# Patient Record
Sex: FEMALE | Race: BLACK OR AFRICAN AMERICAN | NOT HISPANIC OR LATINO | URBAN - METROPOLITAN AREA
[De-identification: names, ages, dates, MRNs, and addresses within clinical notes are randomized per-mention and may not be internally consistent; named-entity substitution may affect disease eponyms.]

---

## 2024-10-24 ENCOUNTER — INPATIENT (INPATIENT)
Facility: HOSPITAL | Age: 79
LOS: 3 days | Discharge: HOME | DRG: 312 | End: 2024-10-28
Attending: STUDENT IN AN ORGANIZED HEALTH CARE EDUCATION/TRAINING PROGRAM | Admitting: STUDENT IN AN ORGANIZED HEALTH CARE EDUCATION/TRAINING PROGRAM
Payer: MEDICARE

## 2024-10-24 VITALS
HEIGHT: 62 IN | RESPIRATION RATE: 17 BRPM | TEMPERATURE: 97 F | DIASTOLIC BLOOD PRESSURE: 108 MMHG | WEIGHT: 134.92 LBS | HEART RATE: 70 BPM | SYSTOLIC BLOOD PRESSURE: 197 MMHG | OXYGEN SATURATION: 98 %

## 2024-10-24 DIAGNOSIS — I10 ESSENTIAL (PRIMARY) HYPERTENSION: ICD-10-CM

## 2024-10-24 DIAGNOSIS — N17.9 ACUTE KIDNEY FAILURE, UNSPECIFIED: ICD-10-CM

## 2024-10-24 DIAGNOSIS — D64.9 ANEMIA, UNSPECIFIED: ICD-10-CM

## 2024-10-24 DIAGNOSIS — R55 SYNCOPE AND COLLAPSE: ICD-10-CM

## 2024-10-24 DIAGNOSIS — Z86.73 PERSONAL HISTORY OF TRANSIENT ISCHEMIC ATTACK (TIA), AND CEREBRAL INFARCTION WITHOUT RESIDUAL DEFICITS: ICD-10-CM

## 2024-10-24 DIAGNOSIS — I63.9 CEREBRAL INFARCTION, UNSPECIFIED: ICD-10-CM

## 2024-10-24 DIAGNOSIS — E78.5 HYPERLIPIDEMIA, UNSPECIFIED: ICD-10-CM

## 2024-10-24 DIAGNOSIS — E11.9 TYPE 2 DIABETES MELLITUS WITHOUT COMPLICATIONS: ICD-10-CM

## 2024-10-24 DIAGNOSIS — Z29.9 ENCOUNTER FOR PROPHYLACTIC MEASURES, UNSPECIFIED: ICD-10-CM

## 2024-10-24 DIAGNOSIS — R79.89 OTHER SPECIFIED ABNORMAL FINDINGS OF BLOOD CHEMISTRY: ICD-10-CM

## 2024-10-24 LAB
ALBUMIN SERPL ELPH-MCNC: 3.6 G/DL — SIGNIFICANT CHANGE UP (ref 3.5–5)
ALP SERPL-CCNC: 164 U/L — HIGH (ref 40–120)
ALT FLD-CCNC: 13 U/L DA — SIGNIFICANT CHANGE UP (ref 10–60)
ANION GAP SERPL CALC-SCNC: 7 MMOL/L — SIGNIFICANT CHANGE UP (ref 5–17)
AST SERPL-CCNC: 12 U/L — SIGNIFICANT CHANGE UP (ref 10–40)
BASOPHILS # BLD AUTO: 0.03 K/UL — SIGNIFICANT CHANGE UP (ref 0–0.2)
BASOPHILS NFR BLD AUTO: 0.6 % — SIGNIFICANT CHANGE UP (ref 0–2)
BILIRUB SERPL-MCNC: 0.7 MG/DL — SIGNIFICANT CHANGE UP (ref 0.2–1.2)
BUN SERPL-MCNC: 50 MG/DL — HIGH (ref 7–18)
CALCIUM SERPL-MCNC: 9.8 MG/DL — SIGNIFICANT CHANGE UP (ref 8.4–10.5)
CHLORIDE SERPL-SCNC: 106 MMOL/L — SIGNIFICANT CHANGE UP (ref 96–108)
CO2 SERPL-SCNC: 27 MMOL/L — SIGNIFICANT CHANGE UP (ref 22–31)
CREAT SERPL-MCNC: 3.79 MG/DL — HIGH (ref 0.5–1.3)
EGFR: 12 ML/MIN/1.73M2 — LOW
EOSINOPHIL # BLD AUTO: 0.06 K/UL — SIGNIFICANT CHANGE UP (ref 0–0.5)
EOSINOPHIL NFR BLD AUTO: 1.1 % — SIGNIFICANT CHANGE UP (ref 0–6)
GLUCOSE SERPL-MCNC: 131 MG/DL — HIGH (ref 70–99)
HCT VFR BLD CALC: 30.5 % — LOW (ref 34.5–45)
HGB BLD-MCNC: 9.5 G/DL — LOW (ref 11.5–15.5)
IMM GRANULOCYTES NFR BLD AUTO: 0.4 % — SIGNIFICANT CHANGE UP (ref 0–0.9)
LYMPHOCYTES # BLD AUTO: 1.24 K/UL — SIGNIFICANT CHANGE UP (ref 1–3.3)
LYMPHOCYTES # BLD AUTO: 23.5 % — SIGNIFICANT CHANGE UP (ref 13–44)
MCHC RBC-ENTMCNC: 25.7 PG — LOW (ref 27–34)
MCHC RBC-ENTMCNC: 31.1 GM/DL — LOW (ref 32–36)
MCV RBC AUTO: 82.7 FL — SIGNIFICANT CHANGE UP (ref 80–100)
MONOCYTES # BLD AUTO: 0.26 K/UL — SIGNIFICANT CHANGE UP (ref 0–0.9)
MONOCYTES NFR BLD AUTO: 4.9 % — SIGNIFICANT CHANGE UP (ref 2–14)
NEUTROPHILS # BLD AUTO: 3.66 K/UL — SIGNIFICANT CHANGE UP (ref 1.8–7.4)
NEUTROPHILS NFR BLD AUTO: 69.5 % — SIGNIFICANT CHANGE UP (ref 43–77)
NRBC # BLD: 0 /100 WBCS — SIGNIFICANT CHANGE UP (ref 0–0)
PLATELET # BLD AUTO: 326 K/UL — SIGNIFICANT CHANGE UP (ref 150–400)
POTASSIUM SERPL-MCNC: 3 MMOL/L — LOW (ref 3.5–5.3)
POTASSIUM SERPL-SCNC: 3 MMOL/L — LOW (ref 3.5–5.3)
PROT SERPL-MCNC: 7.9 G/DL — SIGNIFICANT CHANGE UP (ref 6–8.3)
RBC # BLD: 3.69 M/UL — LOW (ref 3.8–5.2)
RBC # FLD: 14.1 % — SIGNIFICANT CHANGE UP (ref 10.3–14.5)
SODIUM SERPL-SCNC: 140 MMOL/L — SIGNIFICANT CHANGE UP (ref 135–145)
TROPONIN I, HIGH SENSITIVITY RESULT: 123.5 NG/L — HIGH
WBC # BLD: 5.27 K/UL — SIGNIFICANT CHANGE UP (ref 3.8–10.5)
WBC # FLD AUTO: 5.27 K/UL — SIGNIFICANT CHANGE UP (ref 3.8–10.5)

## 2024-10-24 PROCEDURE — 70450 CT HEAD/BRAIN W/O DYE: CPT | Mod: 26

## 2024-10-24 PROCEDURE — 99223 1ST HOSP IP/OBS HIGH 75: CPT | Mod: GC

## 2024-10-24 PROCEDURE — 93010 ELECTROCARDIOGRAM REPORT: CPT

## 2024-10-24 PROCEDURE — 99285 EMERGENCY DEPT VISIT HI MDM: CPT

## 2024-10-24 RX ORDER — INSULIN LISPRO 100/ML
VIAL (ML) SUBCUTANEOUS
Refills: 0 | Status: DISCONTINUED | OUTPATIENT
Start: 2024-10-24 | End: 2024-10-28

## 2024-10-24 RX ORDER — INFLUENZ VIR VAC TV P-SURF2003 15MCG/.5ML
0.5 SYRINGE (ML) INTRAMUSCULAR ONCE
Refills: 0 | Status: DISCONTINUED | OUTPATIENT
Start: 2024-10-24 | End: 2024-10-28

## 2024-10-24 RX ORDER — AMLODIPINE BESYLATE 10 MG
5 TABLET ORAL ONCE
Refills: 0 | Status: COMPLETED | OUTPATIENT
Start: 2024-10-24 | End: 2024-10-24

## 2024-10-24 RX ORDER — ONDANSETRON HYDROCHLORIDE 2 MG/ML
4 INJECTION, SOLUTION INTRAMUSCULAR; INTRAVENOUS EVERY 8 HOURS
Refills: 0 | Status: DISCONTINUED | OUTPATIENT
Start: 2024-10-24 | End: 2024-10-28

## 2024-10-24 RX ORDER — POTASSIUM CHLORIDE 10 MEQ
40 TABLET, EXTENDED RELEASE ORAL ONCE
Refills: 0 | Status: COMPLETED | OUTPATIENT
Start: 2024-10-24 | End: 2024-10-25

## 2024-10-24 RX ORDER — ASPIRIN/MAG CARB/ALUMINUM AMIN 325 MG
81 TABLET ORAL DAILY
Refills: 0 | Status: DISCONTINUED | OUTPATIENT
Start: 2024-10-25 | End: 2024-10-28

## 2024-10-24 RX ORDER — ASPIRIN/MAG CARB/ALUMINUM AMIN 325 MG
325 TABLET ORAL ONCE
Refills: 0 | Status: COMPLETED | OUTPATIENT
Start: 2024-10-24 | End: 2024-10-25

## 2024-10-24 RX ORDER — ACETAMINOPHEN 500 MG
650 TABLET ORAL EVERY 6 HOURS
Refills: 0 | Status: DISCONTINUED | OUTPATIENT
Start: 2024-10-24 | End: 2024-10-28

## 2024-10-24 RX ORDER — GLUCAGON INJECTION, SOLUTION 1 MG/.2ML
1 INJECTION, SOLUTION SUBCUTANEOUS ONCE
Refills: 0 | Status: DISCONTINUED | OUTPATIENT
Start: 2024-10-24 | End: 2024-10-28

## 2024-10-24 RX ORDER — MELATONIN 5 MG
3 TABLET ORAL AT BEDTIME
Refills: 0 | Status: DISCONTINUED | OUTPATIENT
Start: 2024-10-24 | End: 2024-10-28

## 2024-10-24 RX ORDER — HEPARIN SODIUM 10000 [USP'U]/ML
5000 INJECTION INTRAVENOUS; SUBCUTANEOUS EVERY 8 HOURS
Refills: 0 | Status: DISCONTINUED | OUTPATIENT
Start: 2024-10-24 | End: 2024-10-28

## 2024-10-24 RX ORDER — ASPIRIN/MAG CARB/ALUMINUM AMIN 325 MG
324 TABLET ORAL ONCE
Refills: 0 | Status: COMPLETED | OUTPATIENT
Start: 2024-10-24 | End: 2024-10-24

## 2024-10-24 RX ORDER — MAGNESIUM, ALUMINUM HYDROXIDE 200-200 MG
30 TABLET,CHEWABLE ORAL EVERY 4 HOURS
Refills: 0 | Status: DISCONTINUED | OUTPATIENT
Start: 2024-10-24 | End: 2024-10-28

## 2024-10-24 RX ADMIN — Medication 5 MILLIGRAM(S): at 15:01

## 2024-10-24 RX ADMIN — Medication 324 MILLIGRAM(S): at 15:52

## 2024-10-24 RX ADMIN — HEPARIN SODIUM 5000 UNIT(S): 10000 INJECTION INTRAVENOUS; SUBCUTANEOUS at 22:50

## 2024-10-24 RX ADMIN — Medication 80 MILLIGRAM(S): at 23:31

## 2024-10-24 NOTE — H&P ADULT - CONVERSATION DETAILS
Ms. Garcia was admitted to the  medicine floors .   I spoke with the patient who  and we had an extensive conversation about her  care and current condition.     Discussed overall goals of care with the patient.  During this discussion we reviewed the current diagnosis, treatment plan, and likely prognosis. An explantation of advanced directives and MOLST was provided. The patient identifies quality of  life as one of the most important goals to the patient.  After this conversation decision was made by the patient to change status to DNR/DNI/ NO NIV Allow Natural Death. MOLST form completed, signed, and placed in chart.     Above was reviewed with medicine  attending physician  .     JUANITO Cary

## 2024-10-24 NOTE — ED ADULT TRIAGE NOTE - CHIEF COMPLAINT QUOTE
syncopal episode while waiting for food in a restaurant , did not fall  Stated fell dizzy , sat on a chair , then had the episode  As per EMS, ambulatory on scene  non compliant with meds x 1 year

## 2024-10-24 NOTE — H&P ADULT - HISTORY OF PRESENT ILLNESS
79Y/F from home ( alone, NO HHA ) with a PMHx of HTN, DM, HLD, not currently on medications x  past 1 year as her doctor retired and she does not have a PCP since that point BIBEMS  after syncopal episode earlier this afternoon.  Reporting that she was at a takeout waiting to get her food when she suddenly felt lightheaded and fell like she was going to faint.  She was noticed to be unwell by a bystander who called her and sat her down so she did not sustain any injuries.  She syncopized briefly before returning to normal.  She denies any preceding chest pains or shortness of breath.  She denies any chest pains or shortness of breath after the episode.  She does report that she has syncopized in the past (last 1 year ago) but has not had any kind of extended of cardiac monitor or workup.  Patient also states she had a Hx of ? kidney infection or kidney condition which she was supposed to follow up with Nephrologist OP.   Patient states she had a TIA/ mini stroke  2 years ago with no residual deficits for which she was treated at [facility] in [place].     In the ER,   VS /100, RR 18, HR 63, afebrile, sats 100% RA   Labs s/o Hb 9.5, K 3, BUN / Cr 50/ 3.79, , trop 123   EKG - NSR     CTH   Advanced periventricular deep and subcortical white matter   ischemia. 1.1 cm RIGHT anterior frontal parafalcine calcified angioma is   noted.    Marked central atrophy and moderate temporal lobe atrophy.

## 2024-10-24 NOTE — ED PROVIDER NOTE - CLINICAL SUMMARY MEDICAL DECISION MAKING FREE TEXT BOX
Patient presenting with syncopal episode with aura without chest pain.  Is significantly hypertensive on arrival likely chronic as she reports that she has been out of medications for some time.  No focal findings on exam aside from elevated blood pressure.  EKG without diagnostic findings.  Plan for screening labs including troponin given patient's age.  May require admission for further treatment given lack of outpatient follow-up.

## 2024-10-24 NOTE — H&P ADULT - PROBLEM SELECTOR PLAN 3
- Maintain active T&S, 2 large bore peripheral IVs, transfuse for goal hgb >7 or if symptomatic Hb 9.5    - Maintain active T&S, 2 large bore peripheral IVs, transfuse for goal hgb >7 or if symptomatic  - trend CBC  - f/u anemia labs in AM Trop elevated x 1   EKG NSR   patient denies chest pain     - f/u rep trop 10 PM

## 2024-10-24 NOTE — H&P ADULT - PROBLEM SELECTOR PLAN 5
- pt takes rosuvastatin 20 mg at home  - c/w atorvastatin 40 mg   - f/u lipid profile  - Dash Diet - c/w atorvastatin 80 mg   - f/u lipid profile  - Dash Diet h/o DM not on any home meds ( not seen PCP)   will  hold oral dm meds while inpatient   f/u A1c  c/w  sliding scale  Adjust insulin as indicated  FS ACHS q6 while NPO

## 2024-10-24 NOTE — ED PROVIDER NOTE - OBJECTIVE STATEMENT
79-year-old woman with a past medical history of hypertension and diabetes not currently on medications as her doctor retired and she does not have a PCP since that point presenting after syncopal episode earlier this afternoon.  Reporting that she was at a takeout waiting to get her food when she suddenly felt lightheaded and fell like she was going to faint.  She was noticed to be unwell by a bystander who called her and sat her down so she did not sustain any injuries.  She syncopized briefly before returning to normal.  She denies any preceding chest pains or shortness of breath.  She denies any chest pains or shortness of breath after the episode.  She does report that she has syncopized in the past but has not had any kind of extended of cardiac monitor or workup.

## 2024-10-24 NOTE — H&P ADULT - PROBLEM/PLAN-9
Anesthetic History   No history of anesthetic complications            Review of Systems / Medical History  Patient summary reviewed, nursing notes reviewed and pertinent labs reviewed    Pulmonary  Within defined limits                 Neuro/Psych   Within defined limits           Cardiovascular  Within defined limits                     GI/Hepatic/Renal  Within defined limits              Endo/Other  Within defined limits           Other Findings              Physical Exam    Airway             Cardiovascular  Regular rate and rhythm,  S1 and S2 normal,  no murmur, click, rub, or gallop             Dental         Pulmonary  Breath sounds clear to auscultation               Abdominal         Other Findings            Anesthetic Plan    ASA: 1  Anesthesia type: general          Induction: Inhalational  Anesthetic plan and risks discussed with: Patient and Parent / Carmelo Marti
DISPLAY PLAN FREE TEXT

## 2024-10-24 NOTE — PATIENT PROFILE ADULT - FALL HARM RISK - HARM RISK INTERVENTIONS
Assistance with ambulation/Assistance OOB with selected safe patient handling equipment/Communicate Risk of Fall with Harm to all staff/Discuss with provider need for PT consult/Monitor gait and stability/Orthostatic vital signs/Provide patient with walking aids - walker, cane, crutches/Reinforce activity limits and safety measures with patient and family/Sit up slowly, dangle for a short time, stand at bedside before walking/Tailored Fall Risk Interventions/Visual Cue: Yellow wristband and red socks/Bed in lowest position, wheels locked, appropriate side rails in place/Call bell, personal items and telephone in reach/Instruct patient to call for assistance before getting out of bed or chair/Non-slip footwear when patient is out of bed/[place] to call system/Physically safe environment - no spills, clutter or unnecessary equipment/Purposeful Proactive Rounding/Room/bathroom lighting operational, light cord in reach

## 2024-10-24 NOTE — H&P ADULT - PROBLEM SELECTOR PLAN 8
Hx of TIA - no residual deficits ( 2 y ago) Pt w/  BUN / Cr 50/ 3.79  Baseline SCr -unknown   Patient endorses Hx of kidney problem , did not follow with nephro as OP   For now, IVF   Follow BMP daily

## 2024-10-24 NOTE — H&P ADULT - PROBLEM SELECTOR PLAN 4
h/o DM on ____  will  hold oral dm meds while inpatient   f/u A1c  c/w lantus + lispro + sliding scale  Adjust insulin as indicated  FS ACHS q6 while NPO h/o DM not on any home meds ( not seen PCP)   will  hold oral dm meds while inpatient   f/u A1c  c/w  sliding scale  Adjust insulin as indicated  FS ACHS q6 while NPO Hb 9.5    - Maintain active T&S, 2 large bore peripheral IVs, transfuse for goal hgb >7 or if symptomatic  - trend CBC  - f/u anemia labs in AM

## 2024-10-24 NOTE — ED ADULT NURSE NOTE - OBJECTIVE STATEMENT
Reports almost passed out while waiting for food at the restaurant ,was able to sit on a chair ,denied losing consciousness ,falling

## 2024-10-24 NOTE — H&P ADULT - PROBLEM SELECTOR PLAN 2
EKG showing    -orthostatic vitals  -telemetry  -TTE  -cardio  EKG showing NSR   likely 2/2 orthostsis ( patient felt dizzy from laying down to sitting  position )   -orthostatic vitals  -telemetry  -TTE      consider cardiology consult

## 2024-10-24 NOTE — H&P ADULT - ASSESSMENT
79Y/F from home ( alone, NO HHA ) with a PMHx of HTN, DM, HLD, not currently on medications x  past 1 year as her doctor retired and she does not have a PCP since that point BIBEMS  after syncopal episode earlier this afternoon.  Reporting that she was at a takeout waiting to get her food when she suddenly felt lightheaded and fell like she was going to faint.  She was noticed to be unwell by a bystander who called her and sat her down so she did not sustain any injuries.  She syncopized briefly before returning to normal.  She denies any preceding chest pains or shortness of breath.  She denies any chest pains or shortness of breath after the episode.  She does report that she has syncopized in the past (last 1 year ago) but has not had any kind of extended of cardiac monitor or workup.  Patient also states she had a Hx of ? kidney infection or kidney condition which she was supposed to follow up with Nephrologist OP.   Patient states she had a TIA/ mini stroke  2 years ago with no residual deficits for which she was treated at [facility] in [place].     In the ER,   VS /100, RR 18, HR 63, afebrile, sats 100% RA   Labs s/o Hb 9.5, K 3, BUN / Cr 50/ 3.79, , trop 123   EKG - NSR     CTH   Advanced periventricular deep and subcortical white matter   ischemia. 1.1 cm RIGHT anterior frontal parafalcine calcified angioma is   noted.    Marked central atrophy and moderate temporal lobe atrophy.     Admitted to tele for syncope vs CVA r/o.     79Y/F from home ( alone, NO HHA ) with a PMHx of HTN, DM, HLD, not currently on medications x  past 1 year as her doctor retired and she does not have a PCP since that point BIBEMS  after syncopal episode earlier this afternoon.   In the ER, VS /100, RR 18, HR 63, afebrile, sats 100% RA, Labs s/o Hb 9.5, K 3, BUN / Cr 50/ 3.79, , trop 123 , EKG - NSR , CTH -Advanced periventricular deep and subcortical white matter ischemia.     Admitted to tele for syncope vs CVA r/o.

## 2024-10-24 NOTE — H&P ADULT - NSICDXPASTMEDICALHX_GEN_ALL_CORE_FT
PAST MEDICAL HISTORY:  HLD (hyperlipidemia)     HTN (hypertension)     Transient ischemic attack

## 2024-10-24 NOTE — H&P ADULT - PROBLEM SELECTOR PLAN 6
h/o HTN on  Monitor BP  c/w home meds -  Lower MAP 20-25% in next 2-4 hrs h/o HTN on ? labetalol   Monitor BP  hold  home meds for permissive HTN - c/w atorvastatin 80 mg   - f/u lipid profile  - Dash Diet

## 2024-10-24 NOTE — H&P ADULT - PROBLEM SELECTOR PLAN 1
LKW  CT showing   Complaining of  Code stroke NIHSS     called in ED  s/p  (TNK)  EKG showing NSR   dysphagia screen -     -ASA  -plavix 75 mg for 3 weeks  -atorvastatin 80 mg   -telemetry  -cardio  -neuro  -f/u a1c  -f/u lipids  -f/u TTE  -PT consult [facility] this morning - 1 episode of syncope   CT showing chronic changes ( confirmed with radiologist)   EKG showing NSR   dysphagia screen   -ASA  -atorvastatin 80 mg   -telemetry  -neuro- Dr. Briggs consulted   -f/u a1c  -f/u lipids  -f/u TTE  -PT consult

## 2024-10-24 NOTE — ED PROVIDER NOTE - CARE PLAN
1 Principal Discharge DX:	Syncope  Secondary Diagnosis:	Uncontrolled hypertension  Secondary Diagnosis:	Chronic kidney disease (CKD)

## 2024-10-24 NOTE — ED PROVIDER NOTE - PHYSICAL EXAMINATION
Exam:  General: Patient well appearing, significantly hypertensive otherwise vital signs within normal limits  HEENT: airway patent with moist mucous membranes  Cardiac: RRR S1/S2 with strong peripheral pulses  Respiratory: lungs clear without respiratory distress  GI: abdomen soft, non tender, non distended  Neuro: no gross neurologic deficits  Skin: warm, well perfused  Psych: normal mood and affect

## 2024-10-24 NOTE — ED PROVIDER NOTE - PROGRESS NOTE DETAILS
Patient's labs notable for likely chronic kidney disease–no prior records available however given her BUN to creatinine ratio I suspect this is more chronic in nature and likely due to her chronically uncontrolled hypertension rather than acute kidney injury as she does not clinically appear dehydrated.  Does have a mildly elevated troponin which could be a marker of cardiac ischemia in the setting of an acute syncopal episode or just could be nonfiltration from the kidney dysfunction potassium also mildly low.  Will add aspirin given the troponin until ACS ruled out.  Will require admission for telemetry monitoring and further treatment of the multiple issues at this time

## 2024-10-24 NOTE — H&P ADULT - ATTENDING COMMENTS
I have seen and evaluated the patient in the emergency department.     She says that she was at a take out place earlier in the day when she felt dizzy and "don't know if I was out of it or not." She says another patron was worried she may have fainted for a few seconds and insisted an ambulance be called and have her brought to the hospital. She was feeling well in her usual state of health before this episode with no other symptoms to report before and no symptoms at present during this interview. She has no fever, chills, HA, CP, dyspnea, numbness or weakness or slurred speech.     On exam, she is a thin woman sitting upright at 45 degrees in ED stretcher in no acute distress. She is afebrile, HR 60-70s, severe hypertension with systolic BP in 190s-200s. Saturating 100% on room air and breathing comfortably on room air. Cardiac exam with normal rate, regular rhythm, no murmurs appreciated. No carotid bruits. Abdomen soft and nontender. No pitting edema.     Neuro exam: Mental status normal. CN II-XII intact without deficits. 5/5 Motor function bilaterally in UE and LE. No dysmetria. Sensation intact to light touch,     I have reviewed her CBC, BMP, cardiac enzymes, glucose. Hgb is 9.5 g/dL. BUN is 50 and Cr is 3.7, unknown baseline. Na 140, K 3. Bicarb 27.     I have personally reviewed her CT Head, do not see hemorrhage or large territory infarct. There is radiologic report of advanced periventricular deep and subcortical white matter ischemia (tried to call radiology for further clarification, no answer).     Assessment and Plan:    79 year old female from home ( alone, NO HHA ) with a PMHx of HTN, DM, HLD, not currently on medications x  past 1 year as her doctor retired and she does not have a PCP since that point BIBEMS  after syncopal episode earlier this afternoon.  Patient was standing and waiting on food at time of event, concerned for neurocardiogenic syncope/vasovagal event. Will admit to tele and obtain echocardiogram for structural heart disease, especially given her age and extreme hypertension.    Regarding her head CT findings, unclear if these are chronic white matter ischemic changes versus acute ischemia, but NIHSS is 0, no focal deficit. Given her severe CKD vs LASHAWN on CKD, will forego CTA Head and Neck as patient is outside window for TPA and even if large vessel occlusion is found, do not feel there would be indication for urgent intervention given her lack of symptoms and normal neurologic exam.     #Sycnope  #Rule Out CVA  #Hypertensive Urgency  #LASHAWN vs LASHAWN on CKD  #DM  #HLD    -admit to tele  -Neuro Consult  -aspirin  -high intensity statin  -TTE with bubble  -permissive hypertension; will given IV labetalol if SBP > 220 mm Hg   -consider cardiology consult in AM  -CT Head reviewed, as above will hold on CTA Head and Neck; if Neurology feels vascular imaging needed, can consider Carotid Dopplers  -trend renal function, but given age, risk factors, and no control on BP, suspect this Cr represents CKD 4    -rest of care per resident note I have seen and evaluated the patient in the emergency department.     She says that she was at a take out place earlier in the day when she felt dizzy and "don't know if I was out of it or not." She says another patron was worried she may have fainted for a few seconds and insisted an ambulance be called and have her brought to the hospital. She was feeling well in her usual state of health before this episode with no other symptoms to report before and no symptoms at present during this interview. She has no fever, chills, HA, CP, dyspnea, numbness or weakness or slurred speech.     On exam, she is a thin woman sitting upright at 45 degrees in ED stretcher in no acute distress. She is afebrile, HR 60-70s, severe hypertension with systolic BP in 190s-200s. Saturating 100% on room air and breathing comfortably on room air. Cardiac exam with normal rate, regular rhythm, no murmurs appreciated. No carotid bruits. Abdomen soft and nontender. No pitting edema.     Neuro exam: Mental status normal. CN II-XII intact without deficits. 5/5 Motor function bilaterally in UE and LE. No dysmetria. Sensation intact to light touch,     I have reviewed her CBC, BMP, cardiac enzymes, glucose. Hgb is 9.5 g/dL. BUN is 50 and Cr is 3.7, unknown baseline. Na 140, K 3. Bicarb 27.     I have personally reviewed her CT Head, do not see hemorrhage or large territory infarct. There is radiologic report of advanced periventricular deep and subcortical white matter ischemia (tried to call radiology for further clarification, no answer).     Assessment and Plan:    79 year old female from home ( alone, NO HHA ) with a PMHx of HTN, DM, HLD, not currently on medications x  past 1 year as her doctor retired and she does not have a PCP since that point BIBEMS  after syncopal episode earlier this afternoon.  Patient was standing and waiting on food at time of event, concerned for neurocardiogenic syncope/vasovagal event. Will admit to tele and obtain echocardiogram for structural heart disease, especially given her age and extreme hypertension.    Regarding her head CT findings, unclear if these are chronic white matter ischemic changes versus acute ischemia, but NIHSS is 0, no focal deficit. Given her severe CKD vs LASHAWN on CKD, will forego CTA Head and Neck as patient is outside window for TPA and even if large vessel occlusion is found, do not feel there would be indication for urgent intervention given her lack of symptoms and normal neurologic exam.     #Sycnope  #Rule Out CVA  #Hypertensive Urgency  #LASHAWN vs LASHAWN on CKD  #DM  #HLD    -admit to tele  -Neuro Consult  -aspirin  -high intensity statin  -TTE with bubble  -permissive hypertension; will given IV labetalol if SBP > 220 mm Hg   -consider cardiology consult in AM  -CT Head reviewed, as above will hold on CTA Head and Neck; if Neurology feels vascular imaging needed, can consider Carotid Dopplers (but suspect that this finding is chronic in nature, and no additional head/neck imaging will be required this admission)  -trend renal function, but given age, risk factors, and no control on BP, suspect this Cr represents CKD 4    -rest of care per resident note

## 2024-10-25 DIAGNOSIS — N18.9 CHRONIC KIDNEY DISEASE, UNSPECIFIED: ICD-10-CM

## 2024-10-25 LAB
A1C WITH ESTIMATED AVERAGE GLUCOSE RESULT: 6 % — HIGH (ref 4–5.6)
ALBUMIN SERPL ELPH-MCNC: 3.8 G/DL — SIGNIFICANT CHANGE UP (ref 3.5–5)
ALP SERPL-CCNC: 162 U/L — HIGH (ref 40–120)
ALT FLD-CCNC: 15 U/L DA — SIGNIFICANT CHANGE UP (ref 10–60)
ANION GAP SERPL CALC-SCNC: 7 MMOL/L — SIGNIFICANT CHANGE UP (ref 5–17)
AST SERPL-CCNC: 11 U/L — SIGNIFICANT CHANGE UP (ref 10–40)
BASOPHILS # BLD AUTO: 0.05 K/UL — SIGNIFICANT CHANGE UP (ref 0–0.2)
BASOPHILS NFR BLD AUTO: 0.9 % — SIGNIFICANT CHANGE UP (ref 0–2)
BILIRUB SERPL-MCNC: 0.6 MG/DL — SIGNIFICANT CHANGE UP (ref 0.2–1.2)
BUN SERPL-MCNC: 49 MG/DL — HIGH (ref 7–18)
CALCIUM SERPL-MCNC: 9.3 MG/DL — SIGNIFICANT CHANGE UP (ref 8.4–10.5)
CHLORIDE SERPL-SCNC: 107 MMOL/L — SIGNIFICANT CHANGE UP (ref 96–108)
CHOLEST SERPL-MCNC: 232 MG/DL — HIGH
CO2 SERPL-SCNC: 27 MMOL/L — SIGNIFICANT CHANGE UP (ref 22–31)
CREAT SERPL-MCNC: 3.55 MG/DL — HIGH (ref 0.5–1.3)
EGFR: 13 ML/MIN/1.73M2 — LOW
EOSINOPHIL # BLD AUTO: 0.15 K/UL — SIGNIFICANT CHANGE UP (ref 0–0.5)
EOSINOPHIL NFR BLD AUTO: 2.7 % — SIGNIFICANT CHANGE UP (ref 0–6)
ESTIMATED AVERAGE GLUCOSE: 126 MG/DL — HIGH (ref 68–114)
GLUCOSE BLDC GLUCOMTR-MCNC: 121 MG/DL — HIGH (ref 70–99)
GLUCOSE BLDC GLUCOMTR-MCNC: 124 MG/DL — HIGH (ref 70–99)
GLUCOSE BLDC GLUCOMTR-MCNC: 135 MG/DL — HIGH (ref 70–99)
GLUCOSE BLDC GLUCOMTR-MCNC: 147 MG/DL — HIGH (ref 70–99)
GLUCOSE BLDC GLUCOMTR-MCNC: 215 MG/DL — HIGH (ref 70–99)
GLUCOSE SERPL-MCNC: 131 MG/DL — HIGH (ref 70–99)
HCT VFR BLD CALC: 31 % — LOW (ref 34.5–45)
HDLC SERPL-MCNC: 46 MG/DL — LOW
HGB BLD-MCNC: 9.9 G/DL — LOW (ref 11.5–15.5)
IMM GRANULOCYTES NFR BLD AUTO: 0.2 % — SIGNIFICANT CHANGE UP (ref 0–0.9)
LIPID PNL WITH DIRECT LDL SERPL: 120 MG/DL — HIGH
LYMPHOCYTES # BLD AUTO: 1.76 K/UL — SIGNIFICANT CHANGE UP (ref 1–3.3)
LYMPHOCYTES # BLD AUTO: 32 % — SIGNIFICANT CHANGE UP (ref 13–44)
MAGNESIUM SERPL-MCNC: 2.5 MG/DL — SIGNIFICANT CHANGE UP (ref 1.6–2.6)
MCHC RBC-ENTMCNC: 26.1 PG — LOW (ref 27–34)
MCHC RBC-ENTMCNC: 31.9 GM/DL — LOW (ref 32–36)
MCV RBC AUTO: 81.6 FL — SIGNIFICANT CHANGE UP (ref 80–100)
MONOCYTES # BLD AUTO: 0.41 K/UL — SIGNIFICANT CHANGE UP (ref 0–0.9)
MONOCYTES NFR BLD AUTO: 7.5 % — SIGNIFICANT CHANGE UP (ref 2–14)
NEUTROPHILS # BLD AUTO: 3.12 K/UL — SIGNIFICANT CHANGE UP (ref 1.8–7.4)
NEUTROPHILS NFR BLD AUTO: 56.7 % — SIGNIFICANT CHANGE UP (ref 43–77)
NON HDL CHOLESTEROL: 186 MG/DL — HIGH
NRBC # BLD: 0 /100 WBCS — SIGNIFICANT CHANGE UP (ref 0–0)
PHOSPHATE SERPL-MCNC: 3.3 MG/DL — SIGNIFICANT CHANGE UP (ref 2.5–4.5)
PLATELET # BLD AUTO: 337 K/UL — SIGNIFICANT CHANGE UP (ref 150–400)
POTASSIUM SERPL-MCNC: 3.1 MMOL/L — LOW (ref 3.5–5.3)
POTASSIUM SERPL-SCNC: 3.1 MMOL/L — LOW (ref 3.5–5.3)
PROT SERPL-MCNC: 7.7 G/DL — SIGNIFICANT CHANGE UP (ref 6–8.3)
RBC # BLD: 3.8 M/UL — SIGNIFICANT CHANGE UP (ref 3.8–5.2)
RBC # FLD: 14.2 % — SIGNIFICANT CHANGE UP (ref 10.3–14.5)
SODIUM SERPL-SCNC: 141 MMOL/L — SIGNIFICANT CHANGE UP (ref 135–145)
TRIGL SERPL-MCNC: 331 MG/DL — HIGH
TROPONIN I, HIGH SENSITIVITY RESULT: 113.2 NG/L — HIGH
TROPONIN I, HIGH SENSITIVITY RESULT: 122.9 NG/L — HIGH
TROPONIN I, HIGH SENSITIVITY RESULT: 123.7 NG/L — HIGH
TSH SERPL-MCNC: 1.45 UU/ML — SIGNIFICANT CHANGE UP (ref 0.34–4.82)
WBC # BLD: 5.5 K/UL — SIGNIFICANT CHANGE UP (ref 3.8–10.5)
WBC # FLD AUTO: 5.5 K/UL — SIGNIFICANT CHANGE UP (ref 3.8–10.5)

## 2024-10-25 PROCEDURE — 99223 1ST HOSP IP/OBS HIGH 75: CPT

## 2024-10-25 PROCEDURE — 99233 SBSQ HOSP IP/OBS HIGH 50: CPT | Mod: GC

## 2024-10-25 RX ORDER — HYDRALAZINE HYDROCHLORIDE 50 MG/1
25 TABLET, FILM COATED ORAL EVERY 8 HOURS
Refills: 0 | Status: DISCONTINUED | OUTPATIENT
Start: 2024-10-25 | End: 2024-10-25

## 2024-10-25 RX ORDER — LABETALOL HCL 200 MG
100 TABLET ORAL
Refills: 0 | Status: DISCONTINUED | OUTPATIENT
Start: 2024-10-25 | End: 2024-10-26

## 2024-10-25 RX ORDER — POTASSIUM CHLORIDE 10 MEQ
40 TABLET, EXTENDED RELEASE ORAL ONCE
Refills: 0 | Status: COMPLETED | OUTPATIENT
Start: 2024-10-25 | End: 2024-10-25

## 2024-10-25 RX ADMIN — Medication 40 MILLIEQUIVALENT(S): at 18:33

## 2024-10-25 RX ADMIN — HYDRALAZINE HYDROCHLORIDE 25 MILLIGRAM(S): 50 TABLET, FILM COATED ORAL at 09:37

## 2024-10-25 RX ADMIN — HEPARIN SODIUM 5000 UNIT(S): 10000 INJECTION INTRAVENOUS; SUBCUTANEOUS at 05:54

## 2024-10-25 RX ADMIN — Medication 2: at 12:15

## 2024-10-25 RX ADMIN — Medication 100 MILLIGRAM(S): at 18:33

## 2024-10-25 RX ADMIN — Medication 40 MILLIEQUIVALENT(S): at 03:23

## 2024-10-25 RX ADMIN — Medication 325 MILLIGRAM(S): at 05:54

## 2024-10-25 RX ADMIN — Medication 81 MILLIGRAM(S): at 12:03

## 2024-10-25 RX ADMIN — HEPARIN SODIUM 5000 UNIT(S): 10000 INJECTION INTRAVENOUS; SUBCUTANEOUS at 21:46

## 2024-10-25 RX ADMIN — Medication 80 MILLIGRAM(S): at 21:46

## 2024-10-25 RX ADMIN — HEPARIN SODIUM 5000 UNIT(S): 10000 INJECTION INTRAVENOUS; SUBCUTANEOUS at 13:08

## 2024-10-25 RX ADMIN — Medication 40 MILLIEQUIVALENT(S): at 12:05

## 2024-10-25 NOTE — PROGRESS NOTE ADULT - PROBLEM SELECTOR PLAN 5
- h/o DM, but not on any home meds and not seen PCP since Jan/2023  - A1c: 6.0  - c/w  sliding scale  - Adjust insulin as indicated  - FS ACHS q6 while NPO

## 2024-10-25 NOTE — PROGRESS NOTE ADULT - PROBLEM SELECTOR PLAN 6
- c/w atorvastatin 80 mg PO daily  - Lipid profile: , HDL 46, Cholesterol 232, Triglycerides 331   - Dash Diet - h/o DM, but not on any home meds and not seen PCP since Jan/2023  - A1c: 6.0  - c/w  sliding scale  - Adjust insulin as indicated  - FS ACHS q6 while NPO

## 2024-10-25 NOTE — PROGRESS NOTE ADULT - PROBLEM SELECTOR PLAN 7
- h/o HTN on ? labetalol   - Monitor BP: 216/88 (10/24) > 158/98 (10/25)  - hold home meds for permissive HTN

## 2024-10-25 NOTE — PROGRESS NOTE ADULT - PROBLEM SELECTOR PLAN 6
- c/w atorvastatin 80 mg PO daily  - Lipid profile: , HDL 46, Cholesterol 232, Triglycerides 331   - Dash Diet

## 2024-10-25 NOTE — PHYSICAL THERAPY INITIAL EVALUATION ADULT - DIAGNOSIS, PT EVAL
(ICF Model) Pt. present w/deficits in Body Structures/Function (Impairments), incl: Strength, Balance, gait, leading to deficits  of ambulating safely and Unable to safely assess patient on stairs at this time due to elevated BP.

## 2024-10-25 NOTE — PROGRESS NOTE ADULT - SUBJECTIVE AND OBJECTIVE BOX
PGY-1 Progress Note discussed with attending      PLEASE CONTACT ON CALL TEAM:  - On Call Team (Please refer to Callie) FROM 5:00 PM - 8:30PM  - Nightfloat Team FROM 8:30 -7:30 AM    INTERVAL HPI/OVERNIGHT EVENTS:     No acute overnight events. Pt evaluated at bedside. Pt has no new complaints. No complaints of weakness, loss of sensation.      REVIEW OF SYSTEMS:  CONSTITUTIONAL: No acute distress  RESPIRATORY: No shortness of breath, wheezing, or cough  CARDIOVASCULAR: No chest pain or palpitations  GASTROINTESTINAL: No abdominal pain, nausea, vomiting, diarrhea, or constipation  GENITOURINARY: No dysuria or hematuria  NEUROLOGICAL: No numbness, tremors, or loss of strength. Syncope  SKIN: No new lesions    MEDICATIONS  (STANDING):  aspirin enteric coated 81 milliGRAM(s) Oral daily  atorvastatin 80 milliGRAM(s) Oral at bedtime  dextrose 5%. 1000 milliLiter(s) (50 mL/Hr) IV Continuous <Continuous>  dextrose 50% Injectable 25 Gram(s) IV Push once  glucagon  Injectable 1 milliGRAM(s) IntraMuscular once  heparin   Injectable 5000 Unit(s) SubCutaneous every 8 hours  hydrALAZINE 25 milliGRAM(s) Oral every 8 hours  influenza  Vaccine (HIGH DOSE) 0.5 milliLiter(s) IntraMuscular once  insulin lispro (ADMELOG) corrective regimen sliding scale   SubCutaneous three times a day before meals  potassium chloride    Tablet ER 40 milliEquivalent(s) Oral once  potassium chloride    Tablet ER 40 milliEquivalent(s) Oral once    MEDICATIONS  (PRN):  acetaminophen     Tablet .. 650 milliGRAM(s) Oral every 6 hours PRN Temp greater or equal to 38C (100.4F), Mild Pain (1 - 3)  aluminum hydroxide/magnesium hydroxide/simethicone Suspension 30 milliLiter(s) Oral every 4 hours PRN Dyspepsia  dextrose Oral Gel 15 Gram(s) Oral once PRN Blood Glucose LESS THAN 70 milliGRAM(s)/deciliter  melatonin 3 milliGRAM(s) Oral at bedtime PRN Insomnia  ondansetron Injectable 4 milliGRAM(s) IV Push every 8 hours PRN Nausea and/or Vomiting      Vital Signs Last 24 Hrs  T(C): 36.8 (25 Oct 2024 09:13), Max: 37 (24 Oct 2024 20:32)  T(F): 98.2 (25 Oct 2024 09:13), Max: 98.6 (24 Oct 2024 20:32)  HR: 69 (25 Oct 2024 09:13) (63 - 71)  BP: 197/85 (25 Oct 2024 09:14) (190/99 - 216/88)  BP(mean): 125 (24 Oct 2024 17:47) (125 - 125)  RR: 16 (25 Oct 2024 09:13) (16 - 21)  SpO2: 96% (25 Oct 2024 09:13) (95% - 100%)    Parameters below as of 25 Oct 2024 09:13  Patient On (Oxygen Delivery Method): room air        PHYSICAL EXAMINATION:  GENERAL: NAD  HEAD:  Atraumatic, Normocephalic  EYES:  conjunctiva and sclera clear  NECK: Supple  CHEST/LUNG: Clear to auscultation, no wheezing  HEART: Regular rate and rhythm; No murmurs, rubs, or gallops  ABDOMEN: Soft, Nontender, Bowel sounds present, no masses on palpation  NERVOUS SYSTEM:  Alert and oriented x4, CN 2-12 grossly intact.  EXTREMITIES:  No BLE edema  SKIN: warm, dry                          9.9    5.50  )-----------( 337      ( 25 Oct 2024 06:31 )             31.0     10-25    141  |  107  |  49[H]  ----------------------------<  131[H]  3.1[L]   |  27  |  3.55[H]    Ca    9.3      25 Oct 2024 06:31  Phos  3.3     10-25  Mg     2.5     10-25    TPro  7.7  /  Alb  3.8  /  TBili  0.6  /  DBili  x   /  AST  11  /  ALT  15  /  AlkPhos  162[H]  10-25    LIVER FUNCTIONS - ( 25 Oct 2024 06:31 )  Alb: 3.8 g/dL / Pro: 7.7 g/dL / ALK PHOS: 162 U/L / ALT: 15 U/L DA / AST: 11 U/L / GGT: x       Imaging:     CTH no cont:   Advanced periventricular deep and subcortical white matter ischemia. 1.1 cm RIGHT anterior frontal parafalcine calcified angioma is noted. Marked central atrophy and moderate temporal lobe atrophy.   PGY-1 Progress Note discussed with attending      PLEASE CONTACT ON CALL TEAM:  - On Call Team (Please refer to Callie) FROM 5:00 PM - 8:30PM  - Nightfloat Team FROM 8:30 -7:30 AM    INTERVAL HPI/OVERNIGHT EVENTS:     No acute overnight events. Pt evaluated at bedside. Pt has no new complaints. No complaints of weakness or loss of sensation.      MEDICATIONS  (STANDING):  aspirin enteric coated 81 milliGRAM(s) Oral daily  atorvastatin 80 milliGRAM(s) Oral at bedtime  dextrose 5%. 1000 milliLiter(s) (50 mL/Hr) IV Continuous <Continuous>  dextrose 50% Injectable 25 Gram(s) IV Push once  glucagon  Injectable 1 milliGRAM(s) IntraMuscular once  heparin   Injectable 5000 Unit(s) SubCutaneous every 8 hours  hydrALAZINE 25 milliGRAM(s) Oral every 8 hours  influenza  Vaccine (HIGH DOSE) 0.5 milliLiter(s) IntraMuscular once  insulin lispro (ADMELOG) corrective regimen sliding scale   SubCutaneous three times a day before meals  potassium chloride    Tablet ER 40 milliEquivalent(s) Oral once  potassium chloride    Tablet ER 40 milliEquivalent(s) Oral once    MEDICATIONS  (PRN):  acetaminophen     Tablet .. 650 milliGRAM(s) Oral every 6 hours PRN Temp greater or equal to 38C (100.4F), Mild Pain (1 - 3)  aluminum hydroxide/magnesium hydroxide/simethicone Suspension 30 milliLiter(s) Oral every 4 hours PRN Dyspepsia  dextrose Oral Gel 15 Gram(s) Oral once PRN Blood Glucose LESS THAN 70 milliGRAM(s)/deciliter  melatonin 3 milliGRAM(s) Oral at bedtime PRN Insomnia  ondansetron Injectable 4 milliGRAM(s) IV Push every 8 hours PRN Nausea and/or Vomiting      Vital Signs Last 24 Hrs  T(C): 36.8 (25 Oct 2024 09:13), Max: 37 (24 Oct 2024 20:32)  T(F): 98.2 (25 Oct 2024 09:13), Max: 98.6 (24 Oct 2024 20:32)  HR: 69 (25 Oct 2024 09:13) (63 - 71)  BP: 197/85 (25 Oct 2024 09:14) (190/99 - 216/88)  BP(mean): 125 (24 Oct 2024 17:47) (125 - 125)  RR: 16 (25 Oct 2024 09:13) (16 - 21)  SpO2: 96% (25 Oct 2024 09:13) (95% - 100%)    Parameters below as of 25 Oct 2024 09:13  Patient On (Oxygen Delivery Method): room air        PHYSICAL EXAMINATION:  GENERAL: NAD  HEAD:  Atraumatic, Normocephalic  EYES:  conjunctiva and sclera clear  NECK: Supple  CHEST/LUNG: Clear to auscultation, no wheezing  HEART: Regular rate and rhythm; No murmurs, rubs, or gallops  ABDOMEN: Soft, Nontender, Bowel sounds present, no masses on palpation  NERVOUS SYSTEM:  Alert and oriented x4, CN 2-12 grossly intact, no weakness or loss of sensation  EXTREMITIES:  No BLE edema  SKIN: warm, dry                          9.9    5.50  )-----------( 337      ( 25 Oct 2024 06:31 )             31.0     10-25    141  |  107  |  49[H]  ----------------------------<  131[H]  3.1[L]   |  27  |  3.55[H]    Ca    9.3      25 Oct 2024 06:31  Phos  3.3     10-25  Mg     2.5     10-25    TPro  7.7  /  Alb  3.8  /  TBili  0.6  /  DBili  x   /  AST  11  /  ALT  15  /  AlkPhos  162[H]  10-25    LIVER FUNCTIONS - ( 25 Oct 2024 06:31 )  Alb: 3.8 g/dL / Pro: 7.7 g/dL / ALK PHOS: 162 U/L / ALT: 15 U/L DA / AST: 11 U/L / GGT: x       Imaging:     CTH no cont:   Advanced periventricular deep and subcortical white matter ischemia. 1.1 cm RIGHT anterior frontal parafalcine calcified angioma is noted. Marked central atrophy and moderate temporal lobe atrophy.

## 2024-10-25 NOTE — PHYSICAL THERAPY INITIAL EVALUATION ADULT - ADDITIONAL COMMENTS
Patient states has been independent for a while now. She attributes her dizziness to having not been takinb PB pills for a year.

## 2024-10-25 NOTE — PROGRESS NOTE ADULT - PROBLEM SELECTOR PLAN 2
Likely 2/2 orthostasis (patient felt dizzy from laying down to sitting  position )   EKG NSR   - f/u orthostatic vitals  - Telemetry monitoring  - TTE: LVEF 50-55%, no RWMA, trace pericardial effusion  - consider cardiology consult - Pt w/  BUN/Cr 50/ 3.79  - Baseline SCr - unknown   - Patient endorses Hx of kidney problem, did not follow with nephro as OP   - Follow BMP daily  - bladder scan normal

## 2024-10-25 NOTE — PROGRESS NOTE ADULT - ATTENDING COMMENTS
79 year old F from home ( alone, NO HHA ) with a PMHx of HTN, DM, HLD, not currently on medications x  past 1 year as her doctor retired and she does not have a PCP since that point BIBEMS  after syncopal episode while waiting on food at time of event, concerned for neurocardiogenic syncope/vasovagal event.     This AM reports feeling fine no issues or further events of dizziness or lightheadedness. No FND.     Labs and Imaging reviewed.                         9.9    5.50  )-----------( 337      ( 25 Oct 2024 06:31 )             31.0   141  |  107  |  49[H]  ----------------------------( 131[H]     10-25 @ 06:31  3.1[L]   |  27  |  3.55[H]    Ca: 9.3   Phos: 3.3   M.5    TPro: 7.7 / Alb: 3.8 /  TBili 0.6 / DBili x  /  AST 11 /  ALT 15 /  AlkPhos  162[H]  CTH: unclear if these are chronic white matter ischemic changes versus acute ischemia, but NIHSS is 0, no focal deficit Did have CVA history about 1 year ago     Exam:  NAD anox4  RRR  CTABL  Abdo soft NTND BS+  ext wwp   no FND, no weakness or loss of sensation     Plan:  #Sycnope  #Rule Out CVA  #Hypertensive Urgency  #LASHAWN vs LASHAWN on CKD  #T2DM  #HLD  #Demand ischemia     - f/u on neuro recs   - f/u on EED  - monitor for tele events   - f/u on TTE   - c/w asa and statin  - add home BP med (likely labetalol) up titrate as needed   - can give IV labetalol if SBP > 220 mm Hg   - trop 113 and likely component of CKD since no ekg changes or CP no need to keep trending or msg cards   - trend renal function, but given age, risk factors, and no control on BP, suspect this Cr represents CKD 4 and no new LASHAWN   - limit IVF due to high Bp encourage po intake   - pt walks with cane, PT rec is HOME PT  - dispo home pending echo, neuro plan, BP control 79 year old F from home ( alone, NO HHA ) with a PMHx of HTN, DM, HLD, not currently on medications x  past 1 year as her doctor retired and she does not have a PCP since that point BIBEMS  after syncopal episode while waiting on food at time of event, concerned for neurocardiogenic syncope/vasovagal event.     This AM reports feeling fine no issues or further events of dizziness or lightheadedness. No FND.     Labs and Imaging reviewed.                         9.9    5.50  )-----------( 337      ( 25 Oct 2024 06:31 )             31.0   141  |  107  |  49[H]  ----------------------------( 131[H]     10-25 @ 06:31  3.1[L]   |  27  |  3.55[H]    Ca: 9.3   Phos: 3.3   M.5    TPro: 7.7 / Alb: 3.8 /  TBili 0.6 / DBili x  /  AST 11 /  ALT 15 /  AlkPhos  162[H]  CTH: advanced white matter ischemic changes but NIHSS is 0, no focal deficit Did have CVA history about 1 year ago     Exam:  NAD anox4  RRR  CTABL  Abdo soft NTND BS+  ext wwp   no FND, no weakness or loss of sensation     Plan:  #Sycnope  #Rule Out CVA  #Hypertensive Urgency  #LASHAWN vs LASHAWN on CKD  #T2DM  #HLD  #Demand ischemia     - no need to wait for neuro as per rads findings are likely chronic from prior cva   - f/u on EED  - monitor for tele events   - f/u on TTE   - c/w asa and statin  - add home BP med (likely labetalol) up titrate as needed   - can give IV labetalol if SBP > 220 mm Hg   - trop 113 and likely component of CKD since no ekg changes or CP no need to keep trending or msg cards   - trend renal function, but given age, risk factors, and no control on BP, suspect this Cr represents CKD 4 and no new LASHAWN   - limit IVF due to high Bp encourage po intake   - pt walks with cane, PT rec is HOME PT  - dispo home pending echo, BP control, hydration

## 2024-10-25 NOTE — PROGRESS NOTE ADULT - PROBLEM SELECTOR PLAN 3
- Trop: 123.5 (10/24) > 122.9 > 123.7 > 113.2 (10/25)  - EKG NSR - labetolol 100 bid  - Monitor BP: 216/88 (10/24) > 158/98 (10/25)

## 2024-10-25 NOTE — PROGRESS NOTE ADULT - PROBLEM SELECTOR PLAN 8
- Pt w/  BUN/Cr 50/ 3.79  - Baseline SCr - unknown   - Patient endorses Hx of kidney problem, did not follow with nephro as OP   - IVF for now  - Follow BMP daily Hx of TIA - no residual deficits ( 2 y ago)

## 2024-10-25 NOTE — PROGRESS NOTE ADULT - ASSESSMENT
79Y/F from home ( alone, NO HHA ) with a PMHx of HTN, DM, HLD, not currently on medications x  past 1 year as her doctor retired and she does not have a PCP since that point BIBEMS  after syncopal episode earlier this afternoon.  In the ER, VS /100, RR 18, HR 63, afebrile, O2sat 100% RA, Labs s/o Hb 9.5, K 3, BUN / Cr 50/ 3.79, , trop 123 , EKG - NSR , CTH -Advanced periventricular deep and subcortical white matter ischemia. Admitted to tele for syncope vs CVA r/o.

## 2024-10-25 NOTE — PHYSICAL THERAPY INITIAL EVALUATION ADULT - PERTINENT HX OF CURRENT PROBLEM, REHAB EVAL
Patient admitted from home due to worsening dizziness while she was at the store. Bystanders called EMS for her.

## 2024-10-25 NOTE — PROGRESS NOTE ADULT - PROBLEM SELECTOR PLAN 1
[place] this morning - 1 episode of syncope   CT showing chronic changes (confirmed with radiologist): Advanced periventricular deep and subcortical white matter ischemia   EKG showing NSR   -c/w ASA 81mg PO daily   -c/w Atorvastatin 80 mg   -Telemetry monitoring  - A1c 6.0  - Lipid profile: , HDL 46, Cholesterol 232, Triglycerides 331   - TTE: LVEF 50-55%, no RWMA, trace pericardial effusion  - PT consult orthpsatics positive  EKG NSR   - Telemetry monitoring  - TTE: LVEF 50-55%, no RWMA, trace pericardial effusion  CT showing chronic changes (confirmed with radiologist): Advanced periventricular deep and subcortical white matter ischemia   EKG showing NSR   -c/w ASA 81mg PO daily   -c/w Atorvastatin 80 mg  - F/U echo

## 2024-10-25 NOTE — PROGRESS NOTE ADULT - PROBLEM SELECTOR PLAN 1
[place] this morning - 1 episode of syncope   CT showing chronic changes (confirmed with radiologist): Advanced periventricular deep and subcortical white matter ischemia   EKG showing NSR   - f/u dysphagia screen   -c/w ASA 81mg PO daily   -c/w Atorvastatin 80 mg   -Telemetry monitoring  - neuro- Dr. Briggs consulted   - A1c 6.0  - Lipid profile: , HDL 46, Cholesterol 232, Triglycerides 331   - TTE: LVEF 50-55%, no RWMA, trace pericardial effusion  - PT consult

## 2024-10-25 NOTE — PROGRESS NOTE ADULT - PROBLEM SELECTOR PLAN 2
Likely 2/2 orthostasis (patient felt dizzy from laying down to sitting  position )   EKG NSR   - f/u orthostatic vitals  - Telemetry monitoring  - TTE: LVEF 50-55%, no RWMA, trace pericardial effusion  - consider cardiology consult

## 2024-10-25 NOTE — PHYSICAL THERAPY INITIAL EVALUATION ADULT - SITTING BALANCE: DYNAMIC
[0 - No Distress] : Distress Level: 0 [de-identified] : Mr. Shafer is a 68 year old male who is referred for initial consultation for thrombocytopenia. \par He was first told that his platelets may have been borderline based on blood work from 2007.  Platelet counts in Jan of 2021 were 143,000 and in April 136,000.  He deneis bleeding or easy bruising.  He has occasional episodes of feeling lightheaded and nauseas.   [de-identified] : He presents for f/up of mild thrombocytopenia, MGUS,thyroid and prostate cancers and saddle embolus.  He continues on Eliquis and presents to review thrombophilia work-up which was notable only for decreased protein S activity of 53% which may be due to consumption.  He also has a mild lambda MGUS.  Platelet count had normalized to 204,000 at last visit on January 24, 2022.  He was recently diagnosed with thyroid cancer and underwent a thyroidectomy with .  He reports that he has been told by Dr. Borjas and Dr. Green that he does not require CADET therapy.  His PSA has also been elevated and he underwent prostate biopsy that revealed a prostate adenocarcinoma.  He underwent a radical prostatectomy on 1/4/2022 and unfortunately developed a DVT and a saddle embolus for which he was transferred to [facility].  He was treated symptomatically with Eliquis and reports having improved back to baseline. normal balance

## 2024-10-25 NOTE — PROGRESS NOTE ADULT - PROBLEM SELECTOR PLAN 8
- Pt w/  BUN/Cr 50/ 3.79  - Baseline SCr - unknown   - Patient endorses Hx of kidney problem, did not follow with nephro as OP   - IVF for now  - Follow BMP daily

## 2024-10-25 NOTE — PROGRESS NOTE ADULT - PROBLEM SELECTOR PLAN 7
- h/o HTN on ? labetalol   - Monitor BP: 216/88 (10/24) > 158/98 (10/25)  - hold home meds for permissive HTN - c/w atorvastatin 80 mg PO daily  - Lipid profile: , HDL 46, Cholesterol 232, Triglycerides 331   - Dash Diet

## 2024-10-25 NOTE — PROGRESS NOTE ADULT - ASSESSMENT
79Y/F from home ( alone, NO HHA ) with a PMHx of HTN, DM, HLD, not currently on medications x  past 1 year as her doctor retired and she does not have a PCP since that point BIBEMS  after syncopal episode earlier this afternoon.  In the ER, VS /100, RR 18, HR 63, afebrile, O2sat 100% RA, Labs s/o Hb 9.5, K 3, BUN / Cr 50/ 3.79, , trop 123 , EKG - NSR , CTH -Advanced periventricular deep and subcortical white matter ischemia. Admitted to [facility] for syncope.  Also with CKD and hypertension.

## 2024-10-25 NOTE — PHYSICAL THERAPY INITIAL EVALUATION ADULT - GENERAL OBSERVATIONS, REHAB EVAL
Patient received in supine, AxOX3, (+) monitor; denies any pain and reports minimal dizziness at this time.

## 2024-10-25 NOTE — PROGRESS NOTE ADULT - SUBJECTIVE AND OBJECTIVE BOX
PGY-1 Progress Note discussed with attending      PLEASE CONTACT ON CALL TEAM:  - On Call Team (Please refer to Callie) FROM 5:00 PM - 8:30PM  - Nightfloat Team FROM 8:30 -7:30 AM    INTERVAL HPI/OVERNIGHT EVENTS:     No acute overnight events. Pt evaluated at bedside. Pt has no new complaints.      REVIEW OF SYSTEMS:  CONSTITUTIONAL: No acute distress  RESPIRATORY: No shortness of breath, wheezing, or cough  CARDIOVASCULAR: No chest pain or palpitations  GASTROINTESTINAL: No abdominal pain, nausea, vomiting, diarrhea, or constipation  GENITOURINARY: No dysuria or hematuria  NEUROLOGICAL: No numbness, tremors, or loss of strength  SKIN: No new lesions    MEDICATIONS  (STANDING):  aspirin enteric coated 81 milliGRAM(s) Oral daily  atorvastatin 80 milliGRAM(s) Oral at bedtime  dextrose 5%. 1000 milliLiter(s) (50 mL/Hr) IV Continuous <Continuous>  dextrose 50% Injectable 25 Gram(s) IV Push once  glucagon  Injectable 1 milliGRAM(s) IntraMuscular once  heparin   Injectable 5000 Unit(s) SubCutaneous every 8 hours  hydrALAZINE 25 milliGRAM(s) Oral every 8 hours  influenza  Vaccine (HIGH DOSE) 0.5 milliLiter(s) IntraMuscular once  insulin lispro (ADMELOG) corrective regimen sliding scale   SubCutaneous three times a day before meals  potassium chloride    Tablet ER 40 milliEquivalent(s) Oral once  potassium chloride    Tablet ER 40 milliEquivalent(s) Oral once    MEDICATIONS  (PRN):  acetaminophen     Tablet .. 650 milliGRAM(s) Oral every 6 hours PRN Temp greater or equal to 38C (100.4F), Mild Pain (1 - 3)  aluminum hydroxide/magnesium hydroxide/simethicone Suspension 30 milliLiter(s) Oral every 4 hours PRN Dyspepsia  dextrose Oral Gel 15 Gram(s) Oral once PRN Blood Glucose LESS THAN 70 milliGRAM(s)/deciliter  melatonin 3 milliGRAM(s) Oral at bedtime PRN Insomnia  ondansetron Injectable 4 milliGRAM(s) IV Push every 8 hours PRN Nausea and/or Vomiting      Vital Signs Last 24 Hrs  T(C): 36.8 (25 Oct 2024 09:13), Max: 37 (24 Oct 2024 20:32)  T(F): 98.2 (25 Oct 2024 09:13), Max: 98.6 (24 Oct 2024 20:32)  HR: 69 (25 Oct 2024 09:13) (63 - 71)  BP: 197/85 (25 Oct 2024 09:14) (190/99 - 216/88)  BP(mean): 125 (24 Oct 2024 17:47) (125 - 125)  RR: 16 (25 Oct 2024 09:13) (16 - 21)  SpO2: 96% (25 Oct 2024 09:13) (95% - 100%)    Parameters below as of 25 Oct 2024 09:13  Patient On (Oxygen Delivery Method): room air        PHYSICAL EXAMINATION:  GENERAL: NAD  HEAD:  Atraumatic, Normocephalic  EYES:  conjunctiva and sclera clear  NECK: Supple  CHEST/LUNG: Clear to auscultation, no wheezing  HEART: Regular rate and rhythm; No murmurs, rubs, or gallops  ABDOMEN: Soft, Nontender, Bowel sounds present, no masses on palpation  NERVOUS SYSTEM:  Alert and oriented x4, CN 2-12 grossly intact.  EXTREMITIES:  No BLE edema  SKIN: warm, dry                          9.9    5.50  )-----------( 337      ( 25 Oct 2024 06:31 )             31.0     10-25    141  |  107  |  49[H]  ----------------------------<  131[H]  3.1[L]   |  27  |  3.55[H]    Ca    9.3      25 Oct 2024 06:31  Phos  3.3     10-25  Mg     2.5     10-25    TPro  7.7  /  Alb  3.8  /  TBili  0.6  /  DBili  x   /  AST  11  /  ALT  15  /  AlkPhos  162[H]  10-25    LIVER FUNCTIONS - ( 25 Oct 2024 06:31 )  Alb: 3.8 g/dL / Pro: 7.7 g/dL / ALK PHOS: 162 U/L / ALT: 15 U/L DA / AST: 11 U/L / GGT: x       Imaging:     CTH no cont:   Advanced periventricular deep and subcortical white matter ischemia. 1.1 cm RIGHT anterior frontal parafalcine calcified angioma is noted. Marked central atrophy and moderate temporal lobe atrophy.

## 2024-10-25 NOTE — PROGRESS NOTE ADULT - PROBLEM SELECTOR PLAN 4
- Hb 9.5 (10/24) > 9.9 (10/25)  - Maintain active T&S, 2 large bore peripheral IVs, transfuse for goal hgb >7 or if symptomatic  - trend CBC Plateaued  - Trop: 123.5 (10/24) > 122.9 > 123.7 > 113.2 (10/25)  - EKG NSR

## 2024-10-25 NOTE — PROGRESS NOTE ADULT - PROBLEM SELECTOR PLAN 5
- h/o DM, but not on any home meds and not seen PCP since Jan/2023  - A1c: 6.0  - c/w  sliding scale  - Adjust insulin as indicated  - FS ACHS q6 while NPO - Hb 9.5 (10/24) > 9.9 (10/25)  - Maintain active T&S, 2 large bore peripheral IVs, transfuse for goal hgb >7 or if symptomatic  - trend CBC

## 2024-10-25 NOTE — PROGRESS NOTE ADULT - PROBLEM SELECTOR PLAN 4
- Hb 9.5 (10/24) > 9.9 (10/25)  - Maintain active T&S, 2 large bore peripheral IVs, transfuse for goal hgb >7 or if symptomatic  - trend CBC

## 2024-10-25 NOTE — CHART NOTE - NSCHARTNOTEFT_GEN_A_CORE
Signed out by NP to f/u on elevated trop 123.5 as 2nd troponin ordered at 10PM, but was not collected. Informed nurse to collect and send. Repeat trop 122.9.

## 2024-10-26 LAB
ANION GAP SERPL CALC-SCNC: 8 MMOL/L — SIGNIFICANT CHANGE UP (ref 5–17)
BUN SERPL-MCNC: 51 MG/DL — HIGH (ref 7–18)
CALCIUM SERPL-MCNC: 9.4 MG/DL — SIGNIFICANT CHANGE UP (ref 8.4–10.5)
CHLORIDE SERPL-SCNC: 111 MMOL/L — HIGH (ref 96–108)
CO2 SERPL-SCNC: 24 MMOL/L — SIGNIFICANT CHANGE UP (ref 22–31)
CREAT SERPL-MCNC: 3.65 MG/DL — HIGH (ref 0.5–1.3)
EGFR: 12 ML/MIN/1.73M2 — LOW
GLUCOSE BLDC GLUCOMTR-MCNC: 105 MG/DL — HIGH (ref 70–99)
GLUCOSE BLDC GLUCOMTR-MCNC: 112 MG/DL — HIGH (ref 70–99)
GLUCOSE BLDC GLUCOMTR-MCNC: 112 MG/DL — HIGH (ref 70–99)
GLUCOSE BLDC GLUCOMTR-MCNC: 132 MG/DL — HIGH (ref 70–99)
GLUCOSE SERPL-MCNC: 120 MG/DL — HIGH (ref 70–99)
HCT VFR BLD CALC: 29 % — LOW (ref 34.5–45)
HGB BLD-MCNC: 9.2 G/DL — LOW (ref 11.5–15.5)
MAGNESIUM SERPL-MCNC: 2.4 MG/DL — SIGNIFICANT CHANGE UP (ref 1.6–2.6)
MCHC RBC-ENTMCNC: 26.4 PG — LOW (ref 27–34)
MCHC RBC-ENTMCNC: 31.7 GM/DL — LOW (ref 32–36)
MCV RBC AUTO: 83.3 FL — SIGNIFICANT CHANGE UP (ref 80–100)
NRBC # BLD: 0 /100 WBCS — SIGNIFICANT CHANGE UP (ref 0–0)
PHOSPHATE SERPL-MCNC: 3.1 MG/DL — SIGNIFICANT CHANGE UP (ref 2.5–4.5)
PLATELET # BLD AUTO: 319 K/UL — SIGNIFICANT CHANGE UP (ref 150–400)
POTASSIUM SERPL-MCNC: 3.8 MMOL/L — SIGNIFICANT CHANGE UP (ref 3.5–5.3)
POTASSIUM SERPL-SCNC: 3.8 MMOL/L — SIGNIFICANT CHANGE UP (ref 3.5–5.3)
RBC # BLD: 3.48 M/UL — LOW (ref 3.8–5.2)
RBC # FLD: 14.5 % — SIGNIFICANT CHANGE UP (ref 10.3–14.5)
SODIUM SERPL-SCNC: 143 MMOL/L — SIGNIFICANT CHANGE UP (ref 135–145)
WBC # BLD: 6.13 K/UL — SIGNIFICANT CHANGE UP (ref 3.8–10.5)
WBC # FLD AUTO: 6.13 K/UL — SIGNIFICANT CHANGE UP (ref 3.8–10.5)

## 2024-10-26 PROCEDURE — 99232 SBSQ HOSP IP/OBS MODERATE 35: CPT

## 2024-10-26 RX ORDER — LABETALOL HCL 200 MG
100 TABLET ORAL ONCE
Refills: 0 | Status: COMPLETED | OUTPATIENT
Start: 2024-10-26 | End: 2024-10-26

## 2024-10-26 RX ORDER — LABETALOL HCL 200 MG
200 TABLET ORAL
Refills: 0 | Status: DISCONTINUED | OUTPATIENT
Start: 2024-10-26 | End: 2024-10-27

## 2024-10-26 RX ORDER — HYDRALAZINE HYDROCHLORIDE 50 MG/1
25 TABLET, FILM COATED ORAL EVERY 8 HOURS
Refills: 0 | Status: DISCONTINUED | OUTPATIENT
Start: 2024-10-26 | End: 2024-10-28

## 2024-10-26 RX ORDER — HYDRALAZINE HYDROCHLORIDE 50 MG/1
25 TABLET, FILM COATED ORAL ONCE
Refills: 0 | Status: COMPLETED | OUTPATIENT
Start: 2024-10-26 | End: 2024-10-26

## 2024-10-26 RX ADMIN — HEPARIN SODIUM 5000 UNIT(S): 10000 INJECTION INTRAVENOUS; SUBCUTANEOUS at 21:40

## 2024-10-26 RX ADMIN — Medication 100 MILLIGRAM(S): at 05:42

## 2024-10-26 RX ADMIN — Medication 200 MILLIGRAM(S): at 19:38

## 2024-10-26 RX ADMIN — HYDRALAZINE HYDROCHLORIDE 25 MILLIGRAM(S): 50 TABLET, FILM COATED ORAL at 21:40

## 2024-10-26 RX ADMIN — HEPARIN SODIUM 5000 UNIT(S): 10000 INJECTION INTRAVENOUS; SUBCUTANEOUS at 05:42

## 2024-10-26 RX ADMIN — HYDRALAZINE HYDROCHLORIDE 25 MILLIGRAM(S): 50 TABLET, FILM COATED ORAL at 17:55

## 2024-10-26 RX ADMIN — Medication 80 MILLIGRAM(S): at 21:40

## 2024-10-26 RX ADMIN — Medication 100 MILLIGRAM(S): at 12:54

## 2024-10-26 RX ADMIN — HEPARIN SODIUM 5000 UNIT(S): 10000 INJECTION INTRAVENOUS; SUBCUTANEOUS at 14:51

## 2024-10-26 RX ADMIN — Medication 81 MILLIGRAM(S): at 11:35

## 2024-10-26 NOTE — PROVIDER CONTACT NOTE (OTHER) - SITUATION
Pt. orthostatic blood pressure Lying 210/94 HR 68, Sitting 213/88 HR 68, standing 184/105 78. Pt. denies chest pain, dizziness, headache distress or discomfort.

## 2024-10-26 NOTE — DISCHARGE NOTE PROVIDER - NSDCPNSUBOBJ_GEN_ALL_CORE
Patient admitted after a fall found to then have kidney injury (acute versus chronic) improved slightly then plateaued. Likely progression of chronic kidney disease stage 4 needing follow up in clinic. Orthostatics positive given fluids, no need for neuro work up as CT findings acute. BP very elevated during stay but increased on home meds and added hydralazine.     Patient to continue Labetalol 200 three times per day and can continue with hydralazine 25 three times per day but if any episodes of dizziness or low BP at home can hold off on taking hydralazine. Patient admitted after a fall found to then have kidney injury (acute versus chronic) improved slightly then plateaued. Likely progression of chronic kidney disease stage 4 needing follow up in clinic. Orthostatics positive given fluids, no need for neuro work up as CT findings acute. BP very elevated during stay but increased on home meds and added hydralazine.     Patient to continue Labetalol 200 three times per day, no hydralazine on discharge. Follow up with Dr. Hidalgo in clinic and fistula placement eventually for Dialysis.

## 2024-10-26 NOTE — DISCHARGE NOTE PROVIDER - CARE PROVIDERS DIRECT ADDRESSES
[email or web address]

## 2024-10-26 NOTE — DISCHARGE NOTE PROVIDER - NSDCFUADDAPPT_GEN_ALL_CORE_FT
APPTS ARE READY TO BE MADE: [ ] YES    Best Family or Patient Contact (if needed):    Additional Information about above appointments (if needed):    1: needs to follow up with Primary care Dr. Huber Ang [phone]    Other comments or requests:    APPTS ARE READY TO BE MADE: [ ] YES    Best Family or Patient Contact (if needed):    Additional Information about above appointments (if needed):  1: follow up with Dr. Rocky luz for AV Fistula planning   2: needs to follow up with Primary care Dr. Huber Ang [phone]    Other comments or requests:    APPTS ARE READY TO BE MADE: [ ] YES    Best Family or Patient Contact (if needed):    Additional Information about above appointments (if needed):  1: follow up with Dr. Rocky luz for AV Fistula planning   2: needs to follow up with Primary care Dr. Huber Ang [phone]  3. cardiology     Other comments or requests:    APPTS ARE READY TO BE MADE: [X] YES    Best Family or Patient Contact (if needed):    Additional Information about above appointments (if needed):  1: Nephrology - Dr. Hidalgo renal for AV Fistula planning   2: Primary care Dr. Huber Ang [phone]  3. Cardiology     Other comments or requests:    APPTS ARE READY TO BE MADE: [X] YES    Best Family or Patient Contact (if needed):    Additional Information about above appointments (if needed):  1: Nephrology - Dr. Hidalgo renal for AV Fistula planning   2: Primary care Dr. Huber Ang [phone]  3. Cardiology     Other comments or requests:     Patient was outreached on 10/29, 10/30, and 10/31, but did not answer nor could a voicemail be left. [phone].    Patient was outreached on 10/29, 10/30, and 10/31, but did not answer. A voicemail was left for the patient to return our call. [phone].

## 2024-10-26 NOTE — DISCHARGE NOTE PROVIDER - PROVIDER TOKENS
PROVIDER:[TOKEN:[[id number]]] PROVIDER:[TOKEN:[[id number]]],PROVIDER:[TOKEN:[[id number]]]

## 2024-10-26 NOTE — DISCHARGE NOTE PROVIDER - NSDCCPCAREPLAN_GEN_ALL_CORE_FT
PRINCIPAL DISCHARGE DIAGNOSIS  Diagnosis: Syncope  Assessment and Plan of Treatment: You came to the hosptial after an episode of syncope, meaning passing out.   We did extensive workup. Your EKG was normal sinus rhythm, meaning normal. We suspected a stroke, but your imaging of the head (CT scan) did not show a stroke. We monitored your heart by telemetry,and your heart was determined to not have arrhythmia. Your ultrasound of the heart showed a mildly reduced ability to fill with blood (diastolic dysfunction). Follow up with your cardiologist within one week for further care and evaluation.  Your syncope episode was due to orthostatic hypotension. Orthostatic hypotension is a condition that happens when you feel lightheaded or dizzy after standing up too quickly. It occurs because your body has trouble adjusting blood flow when you change positions. Normally, when you stand up, your blood vessels tighten to help maintain blood flow to your brain. In orthostatic hypotension, this doesn’t happen quickly enough, so your blood pressure drops. This can make you feel faint or unsteady. It’s more common in older adults or people who are dehydrated, have certain health conditions, or are taking specific medications. To help manage it, it's usually recommended to stand up slowly, stay hydrated, and sometimes wear compression stockings.  - Please follow up with PCP within one week.      SECONDARY DISCHARGE DIAGNOSES  Diagnosis: Hypertensive urgency  Assessment and Plan of Treatment: When you came to the hospital, your blood pressure was 200/100. This is high, classified as hypertensive urgency. We decreased your blood pressue by giving you blood pressure medications, labetalol and hydralazine. Your high blood pressure caused your troponin to be high, meaning more stress on the heart, but this came back down.  Hypertensive urgency is a situation where your blood pressure rises very high—typically over 180/120 mmHg—without causing immediate symptoms like chest pain or shortness of breath. It’s not as serious as a hypertensive emergency, where there’s damage to your organs, but it still needs attention.  When your blood pressure is that high, it can be a sign that something isn't right and could lead to more serious health issues if not managed. Symptoms might include headaches, nosebleeds, or anxiety, but many people don’t feel anything at all.  - Please see PCP within one week and take labetalol 200mg TID.    Diagnosis: Chronic kidney disease (CKD)  Assessment and Plan of Treatment: You have chronic kidney disease stage V, meaning chronic dysfunction of the kidney. We ordered test to further evaluate (renal ultrasound, urine studies, and blood work). Please follow up with nephrologist within one week for the results and further care.    Diagnosis: Hyperparathyroidism  Assessment and Plan of Treatment: You were diagnosed with hyperparathyroidism, meaning excessive activity of the parathyroid gland. We started your new medication, calcitriol. Please  this medication from your pharmacy and take it daily as prescribed. Follow up with nephrologist within one week for further evaluation and care. Your parathyroid level (PTH) in the blood is 597.    Diagnosis: Iron deficiency anemia  Assessment and Plan of Treatment: You were diagnosed with iron deficiency anemia, meaning low hemglobin due to low iron levels in the blood. Your hemoglobin was 8.5 to 9.5 while in the hospital. We gave your iron supplement IV in the hospital. We prescribed daily oral iron supplement for you to take daily. Please pick this up from pharam and take as prescribed. Follow up with PCP within one week for further evaluatio and care.    Diagnosis: DM (diabetes mellitus)  Assessment and Plan of Treatment: You have a history of diabetes, meaning high blood sugar. Your A1c is 6.0. We maintained an acceptable blood sugar range for you during hospitalization by giving you insulin as needed. You do not need to take medication righ now, as your A1c (sugar level) is an acceptable range. Pleaese follow up with PCP within one week for further eduaction, evaluation, and care.    Diagnosis: HLD (hyperlipidemia)  Assessment and Plan of Treatment: You have hyperlipidema, which means high cholesterol. We prescribed atorvastatin, a medication to control your cholesterol. This is one of your chronic conditions. Please take this medicine daily and follow up with PCP within one week.

## 2024-10-26 NOTE — PROGRESS NOTE ADULT - PROBLEM SELECTOR PLAN 2
- Pt w/  BUN/Cr 50/ 3.79  - Baseline SCr - unknown   - Patient endorses Hx of kidney problem, did not follow with nephro as OP   - Follow BMP daily  - bladder scan normal

## 2024-10-26 NOTE — PROVIDER CONTACT NOTE (OTHER) - ACTION/TREATMENT ORDERED:
RANDEE Vazquez made aware and instructed to administer the next dose of Labetalol at 6PM. Safety maintained. RANDEE Vazquez made aware and order Labetalol 100 mg po  . Safety maintained.

## 2024-10-26 NOTE — DISCHARGE NOTE PROVIDER - NSDCMRMEDTOKEN_GEN_ALL_CORE_FT
aspirin 81 mg oral tablet: orally  Labetalol: 100 2 times a day   aspirin 81 mg oral delayed release tablet: 1 tab(s) orally once a day  calcitriol 0.25 mcg oral capsule: 1 cap(s) orally once a day  ferrous sulfate 325 mg (65 mg elemental iron) oral delayed release tablet: 1 tab(s) orally every other day  labetalol 200 mg oral tablet: 1 tab(s) orally every 8 hours  Lipitor 80 mg oral tablet: 1 tab(s) orally once a day (at bedtime)

## 2024-10-26 NOTE — DISCHARGE NOTE PROVIDER - CARE PROVIDER_API CALL
Wilton Ang  Internal Medicine  [address], [address]  [place] [address]  Phone: [phone]  Fax: [phone]  Follow Up Time:    Wilton Ang  Internal Medicine  [address], [address]  [place] [address]  Phone: [phone]  Fax: [phone]  Follow Up Time:     Hope Hidalgo  Nephrology  [address], [address]  [place] [address]  Phone: [phone]  Fax: [phone]  Follow Up Time:

## 2024-10-26 NOTE — PROGRESS NOTE ADULT - SUBJECTIVE AND OBJECTIVE BOX
PGY-1 Progress Note discussed with attending      PLEASE CONTACT ON CALL TEAM:  - On Call Team (Please refer to Callie) FROM 5:00 PM - 8:30PM  - Nightfloat Team FROM 8:30 -7:30 AM    INTERVAL HPI/OVERNIGHT EVENTS:     No acute overnight events. Pt evaluated at bedisde. Pt has no new complaints.      REVIEW OF SYSTEMS:  CONSTITUTIONAL: No acute distress  RESPIRATORY: No shortness of breath, wheezing, or cough  CARDIOVASCULAR: No chest pain or palpitations  GASTROINTESTINAL: No abdominal pain, nausea, vomiting, diarrhea, or constipation  GENITOURINARY: No dysuria or hermaturia  NEUROLOGICAL: No numbness, tremors, or loss of strength  SKIN: No new lesions    MEDICATIONS  (STANDING):  aspirin enteric coated 81 milliGRAM(s) Oral daily  atorvastatin 80 milliGRAM(s) Oral at bedtime  dextrose 5%. 1000 milliLiter(s) (50 mL/Hr) IV Continuous <Continuous>  dextrose 50% Injectable 25 Gram(s) IV Push once  glucagon  Injectable 1 milliGRAM(s) IntraMuscular once  heparin   Injectable 5000 Unit(s) SubCutaneous every 8 hours  hydrALAZINE 25 milliGRAM(s) Oral every 8 hours  influenza  Vaccine (HIGH DOSE) 0.5 milliLiter(s) IntraMuscular once  insulin lispro (ADMELOG) corrective regimen sliding scale   SubCutaneous three times a day before meals  labetalol 200 milliGRAM(s) Oral two times a day    MEDICATIONS  (PRN):  acetaminophen     Tablet .. 650 milliGRAM(s) Oral every 6 hours PRN Temp greater or equal to 38C (100.4F), Mild Pain (1 - 3)  aluminum hydroxide/magnesium hydroxide/simethicone Suspension 30 milliLiter(s) Oral every 4 hours PRN Dyspepsia  dextrose Oral Gel 15 Gram(s) Oral once PRN Blood Glucose LESS THAN 70 milliGRAM(s)/deciliter  melatonin 3 milliGRAM(s) Oral at bedtime PRN Insomnia  ondansetron Injectable 4 milliGRAM(s) IV Push every 8 hours PRN Nausea and/or Vomiting      Vital Signs Last 24 Hrs  T(C): 37 (26 Oct 2024 19:08), Max: 37 (26 Oct 2024 19:08)  T(F): 98.6 (26 Oct 2024 19:08), Max: 98.6 (26 Oct 2024 19:08)  HR: 65 (26 Oct 2024 21:37) (63 - 76)  BP: 202/85 (26 Oct 2024 21:37) (162/83 - 217/85)  BP(mean): 125 (26 Oct 2024 10:59) (106 - 125)  RR: 18 (26 Oct 2024 21:37) (18 - 18)  SpO2: 98% (26 Oct 2024 19:08) (96% - 100%)    Parameters below as of 26 Oct 2024 19:08  Patient On (Oxygen Delivery Method): room air        PHYSICAL EXAMINATION:  GENERAL: NAD  HEAD:  Atraumatic, Normocephalic  EYES:  conjunctiva and sclera clear  NECK: Supple  CHEST/LUNG: Clear to auscultation  HEART: Regular rate and rhythm; No murmurs, rubs, or gallops  ABDOMEN: Soft, Nontender, Bowel sounds present, no masses on palpation  NERVOUS SYSTEM:  Alert and oriented  EXTREMITIES:  No BLE edema  SKIN: warm, dry                          9.2    6.13  )-----------( 319      ( 26 Oct 2024 06:11 )             29.0     10-26    143  |  111[H]  |  51[H]  ----------------------------<  120[H]  3.8   |  24  |  3.65[H]    Ca    9.4      26 Oct 2024 06:11  Phos  3.1     10-26  Mg     2.4     10-26    TPro  7.7  /  Alb  3.8  /  TBili  0.6  /  DBili  x   /  AST  11  /  ALT  15  /  AlkPhos  162[H]  10-25    LIVER FUNCTIONS - ( 25 Oct 2024 06:31 )  Alb: 3.8 g/dL / Pro: 7.7 g/dL / ALK PHOS: 162 U/L / ALT: 15 U/L DA / AST: 11 U/L / GGT: x                   I&O's Summary

## 2024-10-26 NOTE — PROGRESS NOTE ADULT - PROBLEM SELECTOR PLAN 1
orthpsatics positive  EKG NSR   - Telemetry monitoring  - TTE: LVEF 50-55%, no RWMA, trace pericardial effusion  CT showing chronic changes (confirmed with radiologist): Advanced periventricular deep and subcortical white matter ischemia   EKG showing NSR   -c/w ASA 81mg PO daily   -c/w Atorvastatin 80 mg

## 2024-10-26 NOTE — PROGRESS NOTE ADULT - ATTENDING COMMENTS
79 year old F from home ( alone, NO HHA ) with a PMHx of HTN, DM, HLD, not currently on medications x  past 1 year as her doctor retired and she does not have a PCP since that point BIBEMS  after syncopal episode while waiting on food at time of event, concerned for neurocardiogenic syncope/vasovagal event.     This AM reports feeling fine no issues or further events of dizziness or lightheadedness. No FND.   Labs and Imaging reviewed.                         9.9    5.50  )-----------( 337      ( 25 Oct 2024 06:31 )             31.0   141  |  107  |  49[H]  ----------------------------( 131[H]     10-25 @ 06:31  3.1[L]   |  27  |  3.55[H]    Ca: 9.3   Phos: 3.3   M.5    TPro: 7.7 / Alb: 3.8 /  TBili 0.6 / DBili x  /  AST 11 /  ALT 15 /  AlkPhos  162[H]  CTH: advanced white matter ischemic changes but NIHSS is 0, no focal deficit Did have CVA history about 1 year ago     Exam:  NAD anox4  RRR  CTABL  Abdo soft NTND BS+  ext wwp   no FND, no weakness or loss of sensation     Plan:  #Sycnope  #Rule Out CVA  #Hypertensive Urgency  #LASHAWN vs LASHAWN on CKD  #T2DM  #HLD  #Demand ischemia     - no need to wait for neuro as per rads findings are likely chronic from prior cva   - monitor for tele events   - TTE normal EF 50-55% no RWA  - c/w asa and statin  - pt was on track to leave today but BP intermittently elevated, no symptoms. Increased labetalol to 200 BID   - can give IV labetalol if SBP > 220 mm Hg   - trop 113 and likely component of CKD since no ekg changes or CP no need to keep trending   - trend renal function, but given age, risk factors, and no control on BP, suspect this Cr represents CKD 4 and no new LASHAWN   - limit IVF due to high Bp encourage po intake   - pt walks with cane, PT rec is HOME PT  - dispo home pending echo, BP control, hydration  - Dispo: with transport tomorrow AM after BP is better controled. SW to refer to home PT care/services

## 2024-10-26 NOTE — PROGRESS NOTE ADULT - ASSESSMENT
79Y/F from home ( alone, NO HHA ) with a PMHx of HTN, DM, HLD, not currently on medications x  past 1 year as her doctor retired and she does not have a PCP since that point BIBEMS  after syncopal episode earlier this afternoon.  In the ER, VS /100, RR 18, HR 63, afebrile, O2sat 100% RA, Labs s/o Hb 9.5, K 3, BUN / Cr 50/ 3.79, , trop 123 , EKG - NSR , CTH -Advanced periventricular deep and subcortical white matter ischemia. Admitted to [facility] for syncope.  Also with CKD and hypertension.

## 2024-10-26 NOTE — DISCHARGE NOTE PROVIDER - NSDCCAREPROVSEEN_GEN_ALL_CORE_FT
Edward Retana Mazin, Edward Swift, Valery Hoffman, Lala Johnson, Perez Vazquez, Toi Avery, Dorys Marr, Suzanne Hodges, Dotty Han, Elio Hidalgo, Hope Ponce, Morales Infante, Yony Mendoza, Katie Retana, Edward

## 2024-10-26 NOTE — DISCHARGE NOTE PROVIDER - HOSPITAL COURSE
80 yo F from home ( alone, NO HHA ) with a PMHx of HTN, DM, HLD, not currently on medications x  past 1 year as her doctor retired and she does not have a PCP since that point BIBEMS  after syncopal episode earlier this afternoon.  Reporting that she was at a takeout waiting to get her food when she suddenly felt lightheaded and fell like she was going to faint.  She was noticed to be unwell by a bystander who called her and sat her down so she did not sustain any injuries.  She synopsized briefly before returning to normal.  She denies any preceding chest pains or shortness of breath.  She denies any chest pains or shortness of breath after the episode.  She does report that she has synopsized in the past (last 1 year ago) but has not had any kind of extended of cardiac monitor or workup.    Orthostatics positive but given fluids and encourage po intake. BP high but improved on Labetalol 100 BID (home dose). No further neuro work up needed. Patient feeling better and CTH results are chronic per radiology. Syncope likely vasovagal/orthostatic. C/w home meds. Will need home physical therapy, and can follow up with PCP.      79Y/F from home ( alone, NO HHA ) with a PMHx of HTN, DM, HLD, CKD not currently on medications x  past 1 year as her doctor retired and she does not have a PCP since that point BIBEMS  after syncopal episode earlier this afternoon.  Reporting that she was at a takeout waiting to get her food when she suddenly felt lightheaded and fell like she was going to faint.  She was noticed to be unwell by a bystander who called her and sat her down so she did not sustain any injuries.  She syncopized briefly before returning to normal.  She denies any preceding chest pains or shortness of breath.  She denies any chest pains or shortness of breath after the episode.  She does report that she has syncopized in the past (last 1 year ago) but has not had any kind of extended of cardiac monitor or workup.  Patient also states she had a Hx of ? kidney infection or kidney condition which she was supposed to follow up with Nephrologist OP.   Patient states she had a TIA/ mini stroke  2 years ago with no residual deficits for which she was treated at [facility] in [place].     In the ER,   VS /100, RR 18, HR 63, afebrile, sats 100% RA   Labs s/o Hb 9.5, K 3, BUN / Cr 50/ 3.79, , trop 123   EKG - NSR     CTH   Advanced periventricular deep and subcortical white matter   ischemia. 1.1 cm RIGHT anterior frontal parafalcine calcified angioma is   noted.    Marked central atrophy and moderate temporal lobe atrophy.      Pt was admitted to telemetry floor for syncope versus CVA rule out, with hypertensive urgency.    1. Syncope versus CVA rule out  - Neurology consulted  - Orthostatics positive   - EKG: NSR  - A1c 6.0    Orthostatics positive but given fluids and encourage po intake. BP high but improved on Labetalol 100 BID (home dose). No further neuro work up needed. Patient feeling better and CTH results are chronic per radiology. Syncope likely vasovagal/orthostatic. C/w home meds. Will need home physical therapy, and can follow up with PCP.      79Y/F from home ( alone, NO HHA ) with a PMHx of HTN, DM, HLD, CKD, TIA not currently on medications x  past 1 year as her doctor retired and she does not have a PCP since that point BIBEMS  after syncopal episode earlier this afternoon.  Reporting that she was at a takeout waiting to get her food when she suddenly felt lightheaded and fell like she was going to faint.  She was noticed to be unwell by a bystander who called her and sat her down so she did not sustain any injuries.  She syncopized briefly before returning to normal.  She denies any preceding chest pains or shortness of breath.  She denies any chest pains or shortness of breath after the episode.  She does report that she has syncopized in the past (last 1 year ago) but has not had any kind of extended of cardiac monitor or workup.  Patient also states she had a Hx of ? kidney infection or kidney condition which she was supposed to follow up with Nephrologist OP.   Patient states she had a TIA/ mini stroke  2 years ago with no residual deficits for which she was treated at [facility] in [place].     In the ER,   VS /100, RR 18, HR 63, afebrile, sats 100% RA   Labs s/o Hb 9.5, K 3, BUN / Cr 50/ 3.79, , trop 123   EKG - NSR     CTH   Advanced periventricular deep and subcortical white matter   ischemia. 1.1 cm RIGHT anterior frontal parafalcine calcified angioma is   noted.    Marked central atrophy and moderate temporal lobe atrophy.      Pt was admitted to telemetry floor for syncope versus CVA rule out, with hypertensive urgency.    Syncope versus CVA rule out  - Neurology and cardiology consulted  - Orthostatics positive   - EKG: NSR  - A1c 6.0  - Lipid panel showing dyslipidemia  - Pt passed dysphagia screen and was given normal diet  - TTE: No intracardiac shunt. EF 50-55%, grade 1 left ventricular diastolic dysfunction, reduced right ventricular systolic function.   - Troponin 123 on admission, downtrended to 113    CKD  -Cr 3.4 to 4.0 during admission  -eGFR 11-13 during admission    Anemia  - Hgb 8.5-9.5 through admission  - Iron studies and ferritin showing negative for iron deficiency    History of DM  - A1c 6.0 during admission  - Blood sugar maintained with insulin sliding scale    Orthostatics positive but given fluids and encourage po intake. BP high but improved on Labetalol 100 BID (home dose). No further neuro work up needed. Patient feeling better and CTH results are chronic per radiology. Syncope likely vasovagal/orthostatic. C/w home meds. Will need home physical therapy, and can follow up with PCP.      79Y/F from home ( alone, NO HHA ) with a PMHx of HTN, DM, HLD, CKD, TIA not currently on medications x  past 1 year as her doctor retired and she does not have a PCP since that point BIBEMS  after syncopal episode earlier this afternoon.  Reporting that she was at a takeout waiting to get her food when she suddenly felt lightheaded and fell like she was going to faint.  She was noticed to be unwell by a bystander who called her and sat her down so she did not sustain any injuries.  She syncopized briefly before returning to normal.  She denies any preceding chest pains or shortness of breath.  She denies any chest pains or shortness of breath after the episode.  She does report that she has syncopized in the past (last 1 year ago) but has not had any kind of extended of cardiac monitor or workup.  Patient also states she had a Hx of ? kidney infection or kidney condition which she was supposed to follow up with Nephrologist OP.   Patient states she had a TIA/ mini stroke  2 years ago with no residual deficits for which she was treated at [facility] in [place].     In the ER,   VS /100, RR 18, HR 63, afebrile, sats 100% RA   Labs s/o Hb 9.5, K 3, BUN / Cr 50/ 3.79, , trop 123   EKG - NSR     CTH   Advanced periventricular deep and subcortical white matter   ischemia. 1.1 cm RIGHT anterior frontal parafalcine calcified angioma is   noted.    Marked central atrophy and moderate temporal lobe atrophy.      Pt was admitted to telemetry floor for syncope versus CVA rule out, with hypertensive urgency.    Syncope versus CVA rule out  - Neurology and cardiology consulted  - Stroke was ruled out as   - Orthostatics positive initially, pt improved with syncope with  - EKG: NSR  - A1c 6.0  - Lipid panel showing dyslipidemia  - Pt passed dysphagia screen and was given normal diet  - TTE: No intracardiac shunt. EF 50-55%, grade 1 left ventricular diastolic dysfunction, reduced right ventricular systolic function.   - Troponin 123 on admission, downtrended to 113    Hypertensive urgency  - /100 on admission  - BP gradually decreased to acceptable range with titrating labetalol and hydralazine  - Pt to be discharged on labetalol 200mg TID    CKD stage V  -Cr 3.4 to 4.0 during admission  -eGFR 11-13 during admission  - Renal US, UA, urine protein, urine creatinine workup to follow outpatient  Check UA, urine protein, urine creatinine.      Hyperparthyroidism  -  -Started calcitriol 0.25mcg PO daily    Iron deficiency Anemia  - Hgb 8.5-9.5 through admission  - Tsat 24% ferritin 152  - Received Venofer 200mg IV x1 now  - Start PO iron on d/c  - Caution with ESAs, as patient has a history of a CVA    History of DM  - A1c 6.0 during admission  - Blood sugar maintained with insulin sliding scale    Orthostatics positive but given fluids and encourage po intake. BP high but improved on Labetalol 100 BID (home dose). No further neuro work up needed. Patient feeling better and CTH results are chronic per radiology. Syncope likely vasovagal/orthostatic. C/w home meds. Will need home physical therapy, and can follow up with PCP.      79Y/F from home ( alone, NO HHA ) with a PMHx of HTN, DM, HLD, CKD, TIA not currently on medications x  past 1 year as her doctor retired and she does not have a PCP since that point BIBEMS  after syncopal episode earlier this afternoon.  Reporting that she was at a takeout waiting to get her food when she suddenly felt lightheaded and fell like she was going to faint.  She was noticed to be unwell by a bystander who called her and sat her down so she did not sustain any injuries.  She syncopized briefly before returning to normal.  She denies any preceding chest pains or shortness of breath.  She denies any chest pains or shortness of breath after the episode.  She does report that she has syncopized in the past (last 1 year ago) but has not had any kind of extended of cardiac monitor or workup.  Patient also states she had a Hx of ? kidney infection or kidney condition which she was supposed to follow up with Nephrologist OP.   Patient states she had a TIA/ mini stroke  2 years ago with no residual deficits for which she was treated at [facility] in [place].     In the ER,   VS /100, RR 18, HR 63, afebrile, sats 100% RA   Labs s/o Hb 9.5, K 3, BUN / Cr 50/ 3.79, , trop 123   EKG - NSR     CTH   Advanced periventricular deep and subcortical white matter   ischemia. 1.1 cm RIGHT anterior frontal parafalcine calcified angioma is   noted.    Marked central atrophy and moderate temporal lobe atrophy.      Pt was admitted to telemetry floor for syncope versus CVA rule out, with hypertensive urgency.    Syncope versus CVA rule out  - Neurology and cardiology consulted  - After discussion with radiology and unremarkable physical exam, stroke was ruled out as CT showed chronic, non-acute, changes.  - Orthostatics positive initially, pt improved with syncope with  - EKG: NSR  - A1c 6.0  - Lipid panel showing dyslipidemia  - Pt passed dysphagia screen and was given normal diet  - TTE: No intracardiac shunt. EF 50-55%, grade 1 left ventricular diastolic dysfunction, reduced right ventricular systolic function.   - Troponin 123 on admission, downtrended to 113. Secondary to demand ischemia from hypertensive urgency    Hypertensive urgency  - /100 on admission  - BP gradually decreased to acceptable range with titrating labetalol and hydralazine  - Pt to be discharged on labetalol 200mg TID    CKD stage V  -Cr 3.4 to 4.0 during admission  -eGFR 11-13 during admission  - Renal US, UA, urine protein, urine creatinine workup to follow outpatient  Check UA, urine protein, urine creatinine.      Hyperparthyroidism  -  -Started calcitriol 0.25mcg PO daily    Iron deficiency Anemia  - Hgb 8.5-9.5 through admission  - Tsat 24% ferritin 152  - Received Venofer 200mg IV x1 now  - Start PO iron on d/c  - Caution with ESAs, as patient has a history of a CVA    History of DM  - A1c 6.0 during admission  - Blood sugar maintained with insulin sliding scale    Orthostatics positive but given fluids and encourage po intake. BP high but improved on Labetalol 100 BID (home dose). No further neuro work up needed. Patient feeling better and CTH results are chronic per radiology. Syncope likely vasovagal/orthostatic. C/w home meds. Will need home physical therapy, and can follow up with PCP.      79Y/F from home (alone, NO HHA ) with a PMHx of HTN, DM, HLD, CKD V, TIA not currently on medications x  past 1 year as her doctor retired and she does not have a PCP since that point, brought in by EMS after syncopal episode earlier this afternoon.  Reporting that she was at a takeout waiting to get her food when she suddenly felt lightheaded and felt like she was going to faint.  She was noticed to be unwell by a bystander who called her and sat her down so she did not sustain any injuries.  She syncopized briefly before returning to normal.  She denies any preceding chest pains or shortness of breath.  She denies any chest pains or shortness of breath after the episode.  She does report that she has syncopized in the past (last 1 year ago) but has not had any kind of extended of cardiac monitor or workup. Patient states she had a TIA 2 years ago with no residual deficits for which she was treated at [facility] in [place].     In the ER,   VS /100, RR 18, HR 63, afebrile, sats 100% RA   Labs s/o Hb 9.5, K 3, BUN / Cr 50/ 3.79, , trop 123   EKG - NSR     CTH   Advanced periventricular deep and subcortical white matter   ischemia. 1.1 cm RIGHT anterior frontal parafalcine calcified angioma is   noted.    Marked central atrophy and moderate temporal lobe atrophy.      Pt was admitted to telemetry floor for syncope versus CVA rule out, with hypertensive urgency.    1. Syncope versus CVA rule out  - Neurology and cardiology consulted  - After discussion with radiology and unremarkable physical exam, stroke was ruled out. CT showed chronic, non-acute, changes.  - Orthostatics positive initially, pt improved with PO intake.  - EKG: NSR  - A1c 6.0  - Lipid panel showing dyslipidemia  - Pt passed dysphagia screen and was given normal diet  - TTE: No intracardiac shunt. EF 50-55%, grade 1 left ventricular diastolic dysfunction, reduced right ventricular systolic function.   - Troponin 123 on admission, downtrended to 113.  Elevated troponin secondary to demand ischemia from hypertensive urgency    Hypertensive urgency  - /100 on admission  - BP gradually decreased to acceptable range with titrating labetalol and hydralazine  - Pt to be discharged on labetalol 200mg TID    CKD stage V  -Cr 3.4 to 4.0 during admission  -eGFR 11-13 during admission  - Renal US, UA, urine protein, urine creatinine workup to follow outpatient  Check UA, urine protein, urine creatinine.      Hyperparthyroidism  -  -Started calcitriol 0.25mcg PO daily    Iron deficiency Anemia  - Hgb 8.5-9.5 through admission  - Tsat 24% ferritin 152  - Received Venofer 200mg IV x1 now  - Start PO iron on d/c  - Caution with ESAs, as patient has a history of a CVA    History of DM  - A1c 6.0 during admission  - Blood sugar maintained with insulin sliding scale    Orthostatics positive but given fluids and encourage po intake. BP high but improved on Labetalol 100 BID (home dose). No further neuro work up needed. Patient feeling better and CTH results are chronic per radiology. Syncope likely vasovagal/orthostatic. C/w home meds. Will need home physical therapy, and can follow up with PCP.      79Y/F from home (alone, NO HHA ) with a PMHx of HTN, DM, HLD, CKD V, TIA not currently on medications x  past 1 year as her doctor retired and she does not have a PCP since that point, brought in by EMS after syncopal episode earlier this afternoon.  Reporting that she was at a takeout waiting to get her food when she suddenly felt lightheaded and felt like she was going to faint.  She was noticed to be unwell by a bystander who called her and sat her down so she did not sustain any injuries.  She syncopized briefly before returning to normal.  She denies any preceding chest pains or shortness of breath.  She denies any chest pains or shortness of breath after the episode.  She does report that she has syncopized in the past (last 1 year ago) but has not had any kind of extended of cardiac monitor or workup. Patient states she had a TIA 2 years ago with no residual deficits for which she was treated at [facility] in [place].     In the ER,   VS /100, RR 18, HR 63, afebrile, sats 100% RA   Labs s/o Hb 9.5, K 3, BUN / Cr 50/ 3.79, , trop 123   EKG - NSR     CTH   Advanced periventricular deep and subcortical white matter   ischemia. 1.1 cm RIGHT anterior frontal parafalcine calcified angioma is   noted.    Marked central atrophy and moderate temporal lobe atrophy.      Pt was admitted to telemetry floor for syncope versus CVA rule out, with hypertensive urgency.    1. Syncope versus CVA rule out  - Neurology and cardiology consulted  - After discussion with radiology and unremarkable physical exam, stroke was ruled out. CT showed chronic, non-acute, changes.  - Orthostatics positive initially, pt improved with PO intake.  - EKG: NSR  - A1c 6.0  - Lipid panel showing dyslipidemia  - Pt passed dysphagia screen and was given normal diet  - TTE: No intracardiac shunt. EF 50-55%, grade 1 left ventricular diastolic dysfunction, reduced right ventricular systolic function.   - Troponin 123 on admission, downtrended to 113.  Elevated troponin secondary to demand ischemia from hypertensive urgency    2. Hypertensive urgency  - /100 on admission  - BP gradually decreased to acceptable range by titrating labetalol and hydralazine  - Pt to be discharged on labetalol 200mg TID    3. CKD stage V  -Cr 3.4 to 4.0 during admission  -eGFR 11-13 during admission  - Renal US, UA, urine protein, urine creatinine were ordered. Results and further workup to follow outpatient.    4. Hyperparthyroidism  -  -Started calcitriol 0.25mcg PO daily    5. Iron deficiency Anemia  - Hgb 8.5-9.5 through admission  - Tsat 24% ferritin 152  - Received Venofer 200mg IV x1 now  - To be discharged on PO iron  - Caution with ESAs, as patient has a history of a CVA    6. History of DM  - A1c 6.0 during admission  - Blood sugar maintained with insulin sliding scale while inpatient      Orthostatics positive but given fluids and encourage po intake. BP high but improved on Labetalol 100 BID (home dose). No further neuro work up needed. Patient feeling better and CTH results are chronic per radiology. Syncope likely vasovagal/orthostatic. C/w home meds. Will need home physical therapy, and can follow up with PCP.      79Y/F from home (alone, NO HHA ) with a PMHx of HTN, DM, HLD, CKD V, TIA not currently on medications x  past 1 year as her doctor retired and she does not have a PCP since that point, brought in by EMS after syncopal episode earlier this afternoon.  Reporting that she was at a takeout waiting to get her food when she suddenly felt lightheaded and felt like she was going to faint.  She was noticed to be unwell by a bystander who called her and sat her down so she did not sustain any injuries.  She syncopized briefly before returning to normal.  She denies any preceding chest pains or shortness of breath.  She denies any chest pains or shortness of breath after the episode.  She does report that she has syncopized in the past (last 1 year ago) but has not had any kind of extended of cardiac monitor or workup. Patient states she had a TIA 2 years ago with no residual deficits for which she was treated at [facility] in [place].     In the ER,   VS /100, RR 18, HR 63, afebrile, sats 100% RA   Labs s/o Hb 9.5, K 3, BUN / Cr 50/ 3.79, , trop 123   EKG - NSR     CTH   Advanced periventricular deep and subcortical white matter   ischemia. 1.1 cm RIGHT anterior frontal parafalcine calcified angioma is   noted.    Marked central atrophy and moderate temporal lobe atrophy.      Pt was admitted to telemetry floor for syncope versus CVA rule out, with hypertensive urgency.    1. Syncope versus CVA rule out  - Neurology and cardiology consulted  - After discussion with radiology and unremarkable physical exam, stroke was ruled out. CT showed chronic, non-acute, changes.  - Orthostatics positive initially, pt improved with PO intake. Syncope attributed to vasovagal or orthostatic.  - EKG: NSR  - A1c 6.0  - Lipid panel showing dyslipidemia  - Pt passed dysphagia screen and was given normal diet  - TTE: No intracardiac shunt. EF 50-55%, grade 1 left ventricular diastolic dysfunction, reduced right ventricular systolic function.   - Troponin 123 on admission, downtrended to 113.  Elevated troponin secondary to demand ischemia from hypertensive urgency    2. Hypertensive urgency  - /100 on admission  - BP gradually decreased to acceptable range by titrating labetalol and hydralazine  - Pt to be discharged on labetalol 200mg TID    3. CKD stage V  -Cr 3.4 to 4.0 during admission  -eGFR 11-13 during admission  - Renal US, UA, urine protein, urine creatinine were ordered. Results and further workup to follow outpatient.    4. Hyperparthyroidism  -  -Started calcitriol 0.25mcg PO daily    5. Iron deficiency Anemia  - Hgb 8.5-9.5 through admission  - Tsat 24% ferritin 152  - Received Venofer 200mg IV x1 now  - To be discharged on PO iron  - Caution with ESAs, as patient has a history of a CVA    6. History of DM  - A1c 6.0 during admission  - Blood sugar maintained with insulin sliding scale while inpatient  - Pt to follow up with PCP outpatient    Given patient's improved clinical status and current hemodynamic stability, decision was made to discharge. Discussed with attending. Please refer to complete medical records for a full hospital course, as this is only a brief summary.   79Y/F from home (alone, NO HHA ) with a PMHx of HTN, DM, HLD, CKD V, TIA not currently on medications x  past 1 year as her doctor retired and she does not have a PCP since that point, brought in by EMS after syncopal episode earlier this afternoon.  Reporting that she was at a takeout waiting to get her food when she suddenly felt lightheaded and felt like she was going to faint.  She was noticed to be unwell by a bystander who called her and sat her down so she did not sustain any injuries.  She syncopized briefly before returning to normal.  She denies any preceding chest pains or shortness of breath.  She denies any chest pains or shortness of breath after the episode.  She does report that she has syncopized in the past (last 1 year ago) but has not had any kind of extended of cardiac monitor or workup. Patient states she had a TIA 2 years ago with no residual deficits for which she was treated at [facility] in [place].     In the ER,   VS /100, RR 18, HR 63, afebrile, sats 100% RA   Labs s/o Hb 9.5, K 3, BUN / Cr 50/ 3.79, , trop 123   EKG - NSR     CTH   Advanced periventricular deep and subcortical white matter   ischemia. 1.1 cm RIGHT anterior frontal parafalcine calcified angioma is   noted.    Marked central atrophy and moderate temporal lobe atrophy.      Pt was admitted to telemetry floor for syncope versus CVA rule out, with hypertensive urgency.    1. Syncope versus CVA rule out  - Neurology and cardiology consulted  - After discussion with radiology and unremarkable physical exam, stroke was ruled out. CT showed chronic, non-acute, changes.  - Orthostatics positive initially, pt improved with PO intake. Syncope attributed to vasovagal or orthostatic.  - EKG: NSR  - A1c 6.0  - Lipid panel showing dyslipidemia  - Pt passed dysphagia screen and was given normal diet  - TTE: No intracardiac shunt. EF 50-55%, grade 1 left ventricular diastolic dysfunction, reduced right ventricular systolic function.   - Troponin 123 on admission, downtrended to 113.  Elevated troponin secondary to demand ischemia from hypertensive urgency    2. Hypertensive urgency  - /100 on admission  - BP gradually decreased to acceptable range by titrating labetalol and hydralazine  - Pt to be discharged on labetalol 200mg TID    3. CKD stage V  -Cr 3.4 to 4.0 during admission  -eGFR 11-13 during admission  - Renal US, UA, urine protein, urine creatinine were ordered. Results and further workup to follow outpatient.    4. Hyperparthyroidism  -  -Started calcitriol 0.25mcg PO daily    5. Iron deficiency Anemia  - Hgb 8.5-9.5 through admission  - Tsat 24% ferritin 152  - Received Venofer 200mg IV x1 now  - To be discharged on PO iron  - Caution with ESAs, as patient has a history of a CVA    6. History of DM  - A1c 6.0 during admission  - Blood sugar maintained with insulin sliding scale while inpatient  - Pt to follow up with PCP outpatient    7. HLD  - Pt was given atorvastatin 80mg QHS through admission    Given patient's improved clinical status and current hemodynamic stability, decision was made to discharge. Discussed with attending. Please refer to complete medical records for a full hospital course, as this is only a brief summary.   79Y/F from home (alone, NO HHA ) with a PMHx of HTN, DM, HLD, CKD V, TIA not currently on medications x  past 1 year as her doctor retired and she does not have a PCP since that point, brought in by EMS after syncopal episode earlier this afternoon.  Reporting that she was at a takeout waiting to get her food when she suddenly felt lightheaded and felt like she was going to faint.  She was noticed to be unwell by a bystander who called her and sat her down so she did not sustain any injuries.  She syncopized briefly before returning to normal.  She denies any preceding chest pains or shortness of breath.  She denies any chest pains or shortness of breath after the episode.  She does report that she has synopsized in the past (last 1 year ago) but has not had any kind of extended of cardiac monitor or workup. Patient states she had a TIA 2 years ago with no residual deficits for which she was treated at [facility] in [place]. Patient was admitted for syncope and hypertensive urgency.     On admission patient CT head showed advanced periventricular deep and subcortical white matter ischemia. 1.1 cm RIGHT anterior frontal parafalcine calcified angioma is   noted. Marked central atrophy and moderate temporal lobe atrophy. Patient orthostatics were initially positive. They were repeated later in the hospital and were noted to be negative. Patient EKG was NSR, and had elevated troponin on admission which dowtrended. Echo showed EF 55%, G1DD, and reduced right ventricular function. Syncope attributed to vasovagal or orthostatic.    Patient also presented with hypertensive urgency and was started on her home dose of labetalol. Patient blood pressures remained uncontrolled and her labetalol dose was increased until her blood pressures were well controlled. Paitent also had elevated troponin that downtrended likely due to demand ischemia in setting of hypertensive urgency.     Patient also had LASHAWN on CKD likely due to hypertensive nephropathy. Patient was evaluated by nephrology and recommended close outpatient follow up for stage 5 kidney disease. 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    Patient also has history of Iron deficiency Anemia and received Venofer 200mg IV x1.     Patient continued on her home medication for hyperlipidemia.     Given patient's improved clinical status and current hemodynamic stability, decision was made to discharge. Discussed with attending. Please refer to complete medical records for a full hospital course, as this is only a brief summary.

## 2024-10-26 NOTE — DISCHARGE NOTE PROVIDER - NSDCHHATTENDCERT_GEN_ALL_CORE
My signature below certifies that the above stated patient is homebound and upon completion of the Face-To-Face encounter, has the need for intermittent skilled nursing, physical therapy and/or speech or occupational therapy services in their home for their current diagnosis as outlined in their initial plan of care. These services will continue to be monitored by myself or another physician.  used

## 2024-10-27 LAB
24R-OH-CALCIDIOL SERPL-MCNC: 13.7 NG/ML — LOW (ref 30–80)
ANION GAP SERPL CALC-SCNC: 8 MMOL/L — SIGNIFICANT CHANGE UP (ref 5–17)
BUN SERPL-MCNC: 50 MG/DL — HIGH (ref 7–18)
CALCIUM SERPL-MCNC: 9.6 MG/DL — SIGNIFICANT CHANGE UP (ref 8.4–10.5)
CALCIUM SERPL-MCNC: 9.8 MG/DL — SIGNIFICANT CHANGE UP (ref 8.4–10.5)
CHLORIDE SERPL-SCNC: 112 MMOL/L — HIGH (ref 96–108)
CO2 SERPL-SCNC: 21 MMOL/L — LOW (ref 22–31)
CREAT SERPL-MCNC: 3.43 MG/DL — HIGH (ref 0.5–1.3)
EGFR: 13 ML/MIN/1.73M2 — LOW
FERRITIN SERPL-MCNC: 152 NG/ML — SIGNIFICANT CHANGE UP (ref 13–330)
GLUCOSE BLDC GLUCOMTR-MCNC: 101 MG/DL — HIGH (ref 70–99)
GLUCOSE BLDC GLUCOMTR-MCNC: 110 MG/DL — HIGH (ref 70–99)
GLUCOSE BLDC GLUCOMTR-MCNC: 171 MG/DL — HIGH (ref 70–99)
GLUCOSE BLDC GLUCOMTR-MCNC: 178 MG/DL — HIGH (ref 70–99)
GLUCOSE SERPL-MCNC: 120 MG/DL — HIGH (ref 70–99)
HCT VFR BLD CALC: 28.5 % — LOW (ref 34.5–45)
HGB BLD-MCNC: 9.3 G/DL — LOW (ref 11.5–15.5)
IRON SATN MFR SERPL: 24 % — SIGNIFICANT CHANGE UP (ref 15–50)
IRON SATN MFR SERPL: 63 UG/DL — SIGNIFICANT CHANGE UP (ref 40–150)
MAGNESIUM SERPL-MCNC: 2.2 MG/DL — SIGNIFICANT CHANGE UP (ref 1.6–2.6)
MCHC RBC-ENTMCNC: 26.5 PG — LOW (ref 27–34)
MCHC RBC-ENTMCNC: 32.6 GM/DL — SIGNIFICANT CHANGE UP (ref 32–36)
MCV RBC AUTO: 81.2 FL — SIGNIFICANT CHANGE UP (ref 80–100)
NRBC # BLD: 0 /100 WBCS — SIGNIFICANT CHANGE UP (ref 0–0)
PHOSPHATE SERPL-MCNC: 4.2 MG/DL — SIGNIFICANT CHANGE UP (ref 2.5–4.5)
PLATELET # BLD AUTO: 298 K/UL — SIGNIFICANT CHANGE UP (ref 150–400)
POTASSIUM SERPL-MCNC: 4.1 MMOL/L — SIGNIFICANT CHANGE UP (ref 3.5–5.3)
POTASSIUM SERPL-SCNC: 4.1 MMOL/L — SIGNIFICANT CHANGE UP (ref 3.5–5.3)
PTH-INTACT FLD-MCNC: 597 PG/ML — HIGH (ref 15–65)
RBC # BLD: 3.51 M/UL — LOW (ref 3.8–5.2)
RBC # FLD: 14.8 % — HIGH (ref 10.3–14.5)
SODIUM SERPL-SCNC: 141 MMOL/L — SIGNIFICANT CHANGE UP (ref 135–145)
TIBC SERPL-MCNC: 268 UG/DL — SIGNIFICANT CHANGE UP (ref 250–450)
UIBC SERPL-MCNC: 205 UG/DL — SIGNIFICANT CHANGE UP (ref 110–370)
WBC # BLD: 5.14 K/UL — SIGNIFICANT CHANGE UP (ref 3.8–10.5)
WBC # FLD AUTO: 5.14 K/UL — SIGNIFICANT CHANGE UP (ref 3.8–10.5)

## 2024-10-27 PROCEDURE — 99233 SBSQ HOSP IP/OBS HIGH 50: CPT

## 2024-10-27 RX ORDER — LABETALOL HCL 200 MG
5 TABLET ORAL ONCE
Refills: 0 | Status: COMPLETED | OUTPATIENT
Start: 2024-10-27 | End: 2024-10-27

## 2024-10-27 RX ORDER — LABETALOL HCL 200 MG
200 TABLET ORAL EVERY 8 HOURS
Refills: 0 | Status: DISCONTINUED | OUTPATIENT
Start: 2024-10-27 | End: 2024-10-28

## 2024-10-27 RX ADMIN — HEPARIN SODIUM 5000 UNIT(S): 10000 INJECTION INTRAVENOUS; SUBCUTANEOUS at 22:02

## 2024-10-27 RX ADMIN — HYDRALAZINE HYDROCHLORIDE 25 MILLIGRAM(S): 50 TABLET, FILM COATED ORAL at 22:02

## 2024-10-27 RX ADMIN — HYDRALAZINE HYDROCHLORIDE 25 MILLIGRAM(S): 50 TABLET, FILM COATED ORAL at 06:26

## 2024-10-27 RX ADMIN — Medication 200 MILLIGRAM(S): at 14:55

## 2024-10-27 RX ADMIN — Medication 1: at 12:17

## 2024-10-27 RX ADMIN — HYDRALAZINE HYDROCHLORIDE 25 MILLIGRAM(S): 50 TABLET, FILM COATED ORAL at 14:55

## 2024-10-27 RX ADMIN — Medication 81 MILLIGRAM(S): at 12:17

## 2024-10-27 RX ADMIN — HEPARIN SODIUM 5000 UNIT(S): 10000 INJECTION INTRAVENOUS; SUBCUTANEOUS at 06:27

## 2024-10-27 RX ADMIN — Medication 80 MILLIGRAM(S): at 22:01

## 2024-10-27 RX ADMIN — Medication 200 MILLIGRAM(S): at 06:27

## 2024-10-27 RX ADMIN — HEPARIN SODIUM 5000 UNIT(S): 10000 INJECTION INTRAVENOUS; SUBCUTANEOUS at 14:55

## 2024-10-27 RX ADMIN — Medication 5 MILLIGRAM(S): at 01:34

## 2024-10-27 RX ADMIN — Medication 200 MILLIGRAM(S): at 22:01

## 2024-10-27 NOTE — PROGRESS NOTE ADULT - ASSESSMENT
79 year old F from home ( alone, NO HHA ) with a PMHx of HTN, DM, HLD, not currently on medications x  past 1 year as her doctor retired and she does not have a PCP since that point BIBEMS  after syncopal episode while waiting on food at time of event, concerned for neurocardiogenic syncope/vasovagal event.     This AM reports feeling fine but feels disappointed that she could not go home yesterday due to her BP. Discussed the new med addd and increase in dosage for plan, is agreeable. In the meantime SW to help with getting a rolilng walker and setting up home PT. Also agreeable to be evaluated by Renal today for LASHAWN/CKD.    Labs and Imaging reviewed.                         9.3    5.14  )-----------( 298      ( 27 Oct 2024 06:38 )             28.5   141  |  112[H]  |  50[H]  ----------------------------( 120[H]     10-27 @ 06:38  4.1   |  21[L]  |  3.43[H]    Ca: 9.6   Phos: 4.2   M.2    TPro: x  / Alb: x  /  TBili x  / DBili x  /  AST x  /  ALT x  /  AlkPhos  x     CTH: advanced white matter ischemic changes but NIHSS is 0, no focal deficit Did have CVA history about 1 year ago     Exam:  NAD anox4  RRR  CTABL  Abdo soft NTND BS+  ext wwp   no FND, no weakness or loss of sensation     Plan:  #Sycnope  #Rule Out CVA  #Hypertensive Urgency  #LASHAWN vs LASHAWN on CKD  #T2DM  #HLD  #Demand ischemia     - no need to wait for neuro as per rads findings are likely chronic from prior cva   - can dc tele; TTE normal EF 50-55% no RWA  - c/w asa and statin  - pt was on track to leave today but BP intermittently elevated, no symptoms. Increased labetalol to 200 now q8hr and added hydralazine 25 q8hr  - can give IV labetalol if SBP > 220 mm Hg or if SBP > 200 x2 consecutive repeats   - f/u with Renal today, f/u on urine lytes and Renal US   - trend renal function, but given age, risk factors, and no control on BP, suspect this Cr represents CKD 4 and no new LASHAWN   - limit IVF due to high Bp encourage po intake   - pt walks with cane, PT rec is HOME PT with rolling walker   - Dispo: with transport tomorrow PM pending BP is better controlled SW to refer to home PT care/services.

## 2024-10-27 NOTE — PROGRESS NOTE ADULT - SUBJECTIVE AND OBJECTIVE BOX
Patient is a 79y old  Female who presents with a chief complaint of syncope vs CVA. (26 Oct 2024 11:53)      INTERVAL HPI/OVERNIGHT EVENTS: no events noted overnight.    MEDICATIONS  (STANDING):  aspirin enteric coated 81 milliGRAM(s) Oral daily  atorvastatin 80 milliGRAM(s) Oral at bedtime  dextrose 5%. 1000 milliLiter(s) (50 mL/Hr) IV Continuous <Continuous>  dextrose 50% Injectable 25 Gram(s) IV Push once  glucagon  Injectable 1 milliGRAM(s) IntraMuscular once  heparin   Injectable 5000 Unit(s) SubCutaneous every 8 hours  hydrALAZINE 25 milliGRAM(s) Oral every 8 hours  influenza  Vaccine (HIGH DOSE) 0.5 milliLiter(s) IntraMuscular once  insulin lispro (ADMELOG) corrective regimen sliding scale   SubCutaneous three times a day before meals  labetalol 200 milliGRAM(s) Oral every 8 hours    MEDICATIONS  (PRN):  acetaminophen     Tablet .. 650 milliGRAM(s) Oral every 6 hours PRN Temp greater or equal to 38C (100.4F), Mild Pain (1 - 3)  aluminum hydroxide/magnesium hydroxide/simethicone Suspension 30 milliLiter(s) Oral every 4 hours PRN Dyspepsia  dextrose Oral Gel 15 Gram(s) Oral once PRN Blood Glucose LESS THAN 70 milliGRAM(s)/deciliter  melatonin 3 milliGRAM(s) Oral at bedtime PRN Insomnia  ondansetron Injectable 4 milliGRAM(s) IV Push every 8 hours PRN Nausea and/or Vomiting      __________________________________________________  REVIEW OF SYSTEMS:    CONSTITUTIONAL: No fever,   EYES: no acute visual disturbances  NECK: No pain or stiffness  RESPIRATORY: No cough; No shortness of breath  CARDIOVASCULAR: No chest pain, no palpitations  GASTROINTESTINAL: No pain. No nausea or vomiting; No diarrhea   NEUROLOGICAL: No headache or numbness, no tremors  MUSCULOSKELETAL: No joint pain, no muscle pain  GENITOURINARY: no dysuria, no frequency, no hesitancy  PSYCHIATRY: no depression , no anxiety  ALL OTHER  ROS negative        Vital Signs Last 24 Hrs  T(C): 36.9 (27 Oct 2024 08:21), Max: 37 (26 Oct 2024 19:08)  T(F): 98.4 (27 Oct 2024 08:21), Max: 98.6 (26 Oct 2024 19:08)  HR: 64 (27 Oct 2024 08:21) (63 - 76)  BP: 175/89 (27 Oct 2024 08:21) (175/89 - 209/87)  BP(mean): 116 (27 Oct 2024 04:45) (116 - 121)  RR: 18 (27 Oct 2024 08:21) (18 - 18)  SpO2: 98% (27 Oct 2024 08:21) (96% - 100%)    Parameters below as of 27 Oct 2024 08:21  Patient On (Oxygen Delivery Method): room air    _________________________________________________  LABS:                        9.3    5.14  )-----------( 298      ( 27 Oct 2024 06:38 )             28.5     10-27    141  |  112[H]  |  50[H]  ----------------------------<  120[H]  4.1   |  21[L]  |  3.43[H]    Ca    9.6      27 Oct 2024 06:38  Phos  4.2     10-27  Mg     2.2     10-27        Urinalysis Basic - ( 27 Oct 2024 06:38 )    Color: x / Appearance: x / SG: x / pH: x  Gluc: 120 mg/dL / Ketone: x  / Bili: x / Urobili: x   Blood: x / Protein: x / Nitrite: x   Leuk Esterase: x / RBC: x / WBC x   Sq Epi: x / Non Sq Epi: x / Bacteria: x      CAPILLARY BLOOD GLUCOSE      POCT Blood Glucose.: 178 mg/dL (27 Oct 2024 11:42)  POCT Blood Glucose.: 110 mg/dL (27 Oct 2024 07:59)  POCT Blood Glucose.: 112 mg/dL (26 Oct 2024 21:09)  POCT Blood Glucose.: 105 mg/dL (26 Oct 2024 16:28)        RADIOLOGY & ADDITIONAL TESTS:    Imaging Personally Reviewed:  YES    Consultant(s) Notes Reviewed:   YES    Care Discussed with Consultants : YES     Plan of care was discussed with patient and /or primary care giver; all questions and concerns were addressed and care was aligned with patient's wishes.

## 2024-10-27 NOTE — CONSULT NOTE ADULT - ASSESSMENT
79 year old female with PMHx of HTN, DM, HLD presented to hospital after a syncopal episode. The patient's lab work during the course of hospitalization is consistent with elevated serum creatinine, so the Nephrology service was consulted for further evaluation.    1. CKD stage V: The patient cannot remember what her serum creatinine was when she last saw a physician. She remember someone telling her that her kidney number "was 4". Creatinine upon admission at 3.79 mg/dL, has mildly decreased. Continue to check CMP daily. Check UA, urine protein, urine creatinine. Avoid additional nephrotoxic medications unless benefits outweigh risks. Dose medications for CrCl < 15. Need accurate measurements of urinary output.    2. Anemia: Check Iron saturation and ferritin to determine if IV iron supplementation is needed. Will need cautions with ESAs, as patient has a history of a CVA. Consider blood transfusion if Hb is < 7 g/dL.    3. HTN w/ CKD: Patient had not been taking medications because her PCP retired a year ago. Will need to maintain BP < 140/90. Started on Labetalol 200 mg TID and Hydralazine 25 mg TID. Defer on ACE-I/ARB due to poor renal function. BP well controlled, but if elevated, may need to consider diuretic in the outpatient setting.    4. Hypokalemia: Improved since admission. Maintain K+ at 4 mEq/L. Check CMP daily.    [facility] NEPHROLOGY  Moshe Oakley M.D.  Yony Infante D.O.  Hope Hidalgo M.D.  MD Caryn Frederick, MSN, ANP-C    Telephone: [phone]  Facsimile: [phone]    [facility] [place] [address], [address]  [place] [address]

## 2024-10-27 NOTE — CHART NOTE - NSCHARTNOTEFT_GEN_A_CORE
Pt noted to have /87. Pt asymptomatic at this time. Outside of permissive HTN window. Given 5mg labetolol IVP x1. Pt noted to have /87. Pt asymptomatic at this time. Outside of permissive HTN window. Given 5mg labetolol IVP x1. Discussed with Dr. Marr PGY3. Pt noted to have /87. Pt asymptomatic at this time. Outside of permissive HTN window. Given 5mg labetolol IVP x1 with improvement to 180s/80s. Discussed with Dr. Marr PGY3. Pt noted to have /87. Pt asymptomatic at this time. Outside of permissive HTN window. Given 5mg labetolol IVP x1 with sine improvement to 180s/80s, she is due soon for a.m. PO BP meds. Discussed with Dr. Marr PGY3.

## 2024-10-27 NOTE — CONSULT NOTE ADULT - SUBJECTIVE AND OBJECTIVE BOX
NEUROLOGY CONSULT NOTE    NAME:  ALEXEY BATRES      ASSESSMENT:  79F with lightheadedness and syncopal event and reported history of transient ischemic attack      RECOMMENDATIONS:    - Monitor orthostatic BP & HR with each vital signs check    - Cardiovascular workup: Telemetry monitoring , Echocardiogram, Cardiology consult as needed    - Plentiful fluid hydration (2 liters, or 8 glasses, of water daily) encouraged    - Patient counseled to maintain caution with rapid changes in position    - Diabetes management as per primary team    - Metabolic and infectious workup and management as per primary team    - If new focal neurological symptoms develop, may consider MRI Brain without contrast (if there are no contraindications)    - DVT ppx: SCDs, Heparin          NOTE TO BE COMPLETED - PLEASE REFER TO ABOVE ONLY AND IGNORE INFORMATION BELOW    *******************************      CHIEF COMPLAINT:  Patient is a 79y old  Female who presents with a chief complaint of syncope vs CVA. (25 Oct 2024 10:34)      HPI:   79Y/F from home ( alone, NO HHA ) with a PMHx of HTN, DM, HLD, not currently on medications x  past 1 year as her doctor retired and she does not have a PCP since that point BIBEMS  after syncopal episode earlier this afternoon.  Reporting that she was at a takeout waiting to get her food when she suddenly felt lightheaded and fell like she was going to faint.  She was noticed to be unwell by a bystander who called her and sat her down so she did not sustain any injuries.  She syncopized briefly before returning to normal.  She denies any preceding chest pains or shortness of breath.  She denies any chest pains or shortness of breath after the episode.  She does report that she has syncopized in the past (last 1 year ago) but has not had any kind of extended of cardiac monitor or workup.  Patient also states she had a Hx of ? kidney infection or kidney condition which she was supposed to follow up with Nephrologist OP.   Patient states she had a TIA/ mini stroke  2 years ago with no residual deficits for which she was treated at [facility] in [place].     In the ER,   VS /100, RR 18, HR 63, afebrile, sats 100% RA   Labs s/o Hb 9.5, K 3, BUN / Cr 50/ 3.79, , trop 123   EKG - NSR     CTH   Advanced periventricular deep and subcortical white matter   ischemia. 1.1 cm RIGHT anterior frontal parafalcine calcified angioma is   noted.    Marked central atrophy and moderate temporal lobe atrophy.  (24 Oct 2024 17:39)      NEURO HPI:      PAST MEDICAL & SURGICAL HISTORY:  HTN (hypertension)      HLD (hyperlipidemia)      Transient ischemic attack          MEDICATIONS:  acetaminophen     Tablet .. 650 milliGRAM(s) Oral every 6 hours PRN  aluminum hydroxide/magnesium hydroxide/simethicone Suspension 30 milliLiter(s) Oral every 4 hours PRN  aspirin enteric coated 81 milliGRAM(s) Oral daily  atorvastatin 80 milliGRAM(s) Oral at bedtime  dextrose 5%. 1000 milliLiter(s) IV Continuous <Continuous>  dextrose 50% Injectable 25 Gram(s) IV Push once  dextrose Oral Gel 15 Gram(s) Oral once PRN  glucagon  Injectable 1 milliGRAM(s) IntraMuscular once  heparin   Injectable 5000 Unit(s) SubCutaneous every 8 hours  influenza  Vaccine (HIGH DOSE) 0.5 milliLiter(s) IntraMuscular once  insulin lispro (ADMELOG) corrective regimen sliding scale   SubCutaneous three times a day before meals  labetalol 100 milliGRAM(s) Oral two times a day  melatonin 3 milliGRAM(s) Oral at bedtime PRN  ondansetron Injectable 4 milliGRAM(s) IV Push every 8 hours PRN      ALLERGIES:  No Known Allergies      FAMILY HISTORY:        SOCIAL HISTORY:  Denies alcohol, tobacco, or illicit drug use      REVIEW OF SYSTEMS:  GENERAL: No fever, weight changes, fatigue  EYES: No eye pain or discharge  EAR/NOSE/MOUTH/THROAT: No sinus or throat pain; No difficulty hearing  NECK: No pain or stiffness  RESPIRATORY: No cough, wheezing, chills, or hemoptysis  CARDIOVASCULAR: No chest pain, palpitations, shortness of breath, or dyspnea on exertion  GASTROINTESTINAL: No abdominal pain, nausea, vomiting, hematemesis, diarrhea, or constipation  GENITOURINARY: No dysuria, frequency, hematuria, or incontinence  SKIN: No rashes or lesions  ENDOCRINE: No heat or cold intolerance  HEMATOLOGIC: No easy bruising or bleeding  PSYCHIATRIC: No depression, anxiety, or mood swings  MUSCULOSKELETAL: No joint pain or swelling  NEUROLOGICAL: As per HPI          OBJECTIVE:    Vital Signs Last 24 Hrs  T(C): 36.8 (25 Oct 2024 09:13), Max: 37 (24 Oct 2024 20:32)  T(F): 98.2 (25 Oct 2024 09:13), Max: 98.6 (24 Oct 2024 20:32)  HR: 69 (25 Oct 2024 09:13) (63 - 71)  BP: 197/85 (25 Oct 2024 09:14) (190/99 - 216/88)  BP(mean): 125 (24 Oct 2024 17:47) (125 - 125)  RR: 16 (25 Oct 2024 09:13) (16 - 21)  SpO2: 96% (25 Oct 2024 09:13) (95% - 100%)  Parameters below as of 25 Oct 2024 09:13  Patient On (Oxygen Delivery Method): room air      General Examination:  General: No acute distress  HEENT: Atraumatic, Normocephalic  Respiratory: CTA B/l.  No crackles, rhonchi, or wheezes.  Cardiovascular: RRR.  Normal S1 & S2.  Normal b/l radial and pedal pulses.    Neurological Examination:  General / Mental Status: AAO x 3.  No aphasia or dysarthria.  Naming and repetition intact.  Cranial Nerves: VFF x 4.  PERRL.  EOMI x 2, No nystagmus or diplopia.  B/l V1-V3 equal and intact to light touch and pinprick.  Symmetric facial movement and palate elevation.  B/l hearing equal to finger rub.  5/5 strength with b/l sternocleidomastoid and trapezius.  Midline tongue protrusion, with no atrophy or fasciculations.  Motor: Normal bulk & tone in all four extremities.  5/5 strength throughout all four extremities.  No downward drift, rigidity, spasticity, or tremors in any of the four extremities.  Sensory: Intact to light touch and pinprick in all four extremities.  Negative Romberg.  Reflex: 2+ and symmetric at b/l biceps, triceps, brachioradialis, patellae, and ankles.  Downgoing toes b/l.  Coordination: No dysmetria with b/l finger-to-nose and heel raise tests.  Symmetric rapid alternating movements b/l.  Gait: Normal, narrow-based gait.  No difficulty with tiptoe, heel, and tandem gaits.        LABORATORY VALUES:                        9.9    5.50  )-----------( 337      ( 25 Oct 2024 06:31 )             31.0       10-25    141  |  107  |  49[H]  ----------------------------<  131[H]  3.1[L]   |  27  |  3.55[H]    Ca    9.3      25 Oct 2024 06:31  Phos  3.3     10-25  Mg     2.5     10-25    TPro  7.7  /  Alb  3.8  /  TBili  0.6  /  DBili  x   /  AST  11  /  ALT  15  /  AlkPhos  162[H]  10-25    10-25 Chol 232[H] LDL -- HDL 46[L] Trig 331[H]                    NEUROIMAGING:          Please contact the Neurology consult service with any neurological questions.    Elio Han MD   of Neurology  [facility] [employer] [facility] NEPHROLOGY- CONSULTATION NOTE    79 year old female with PMHx of HTN, DM, HLD presented to hospital after a syncopal episode. The patient's lab work during the course of hospitalization is consistent with elevated serum creatinine, so the Nephrology service was consulted for further evaluation. The patient cannot remember what her serum creatinine was when she last saw a physician. She remember someone telling her that her kidney number "was 4".    PAST MEDICAL & SURGICAL HISTORY:  HTN (hypertension)  HLD (hyperlipidemia)  Transient ischemic attack    Allergies:  No Known Allergies    Home Medications Reviewed  Hospital Medications:   MEDICATIONS  (STANDING):  aspirin enteric coated 81 milliGRAM(s) Oral daily  atorvastatin 80 milliGRAM(s) Oral at bedtime  dextrose 5%. 1000 milliLiter(s) (50 mL/Hr) IV Continuous <Continuous>  dextrose 50% Injectable 25 Gram(s) IV Push once  glucagon  Injectable 1 milliGRAM(s) IntraMuscular once  heparin   Injectable 5000 Unit(s) SubCutaneous every 8 hours  hydrALAZINE 25 milliGRAM(s) Oral every 8 hours  influenza  Vaccine (HIGH DOSE) 0.5 milliLiter(s) IntraMuscular once  insulin lispro (ADMELOG) corrective regimen sliding scale   SubCutaneous three times a day before meals  labetalol 200 milliGRAM(s) Oral every 8 hours    SOCIAL HISTORY: No current smoking, alcohol, or illicit drug usage.  FAMILY HISTORY: No family history of renal dysfunction.    REVIEW OF SYSTEMS:  CONSTITUTIONAL: No weakness, fevers or chills  EYES/ENT: No visual changes;  No vertigo or throat pain   NECK: No pain or stiffness  RESPIRATORY: No cough, wheezing, hemoptysis; No shortness of breath  CARDIOVASCULAR: No chest pain or palpitations.  GASTROINTESTINAL: No abdominal or epigastric pain. No nausea, vomiting, or hematemesis; No diarrhea or constipation. No melena or hematochezia.  GENITOURINARY: No dysuria, frequency, foamy urine, urinary urgency, incontinence or hematuria  NEUROLOGICAL: No numbness or weakness  SKIN: No itching, burning, rashes, or lesions   VASCULAR: No bilateral lower extremity edema.   All other review of systems is negative unless indicated above.    VITALS:  T(F): 97.9 (10-27-24 @ 12:18), Max: 98.6 (10-26-24 @ 19:08)  HR: 67 (10-27-24 @ 12:18)  BP: 123/72 (10-27-24 @ 12:18)  RR: 18 (10-27-24 @ 12:18)  SpO2: 100% (10-27-24 @ 12:18)  Wt(kg): --      PHYSICAL EXAM:  Constitutional: NAD  HEENT: anicteric sclera, oropharynx clear, MMM  Neck: No JVD  Respiratory: CTAB, no wheezes, rales or rhonchi  Cardiovascular: S1, S2, RRR  Gastrointestinal: BS+, soft, NT/ND  Extremities: No cyanosis or clubbing. No peripheral edema  Neurological: A/O x 3, no focal deficits  Psychiatric: Normal mood, normal affect  : No CVA tenderness. No jacobs.   Skin: Bruising around eyes    LABS:  10-27    141  |  112[H]  |  50[H]  ----------------------------<  120[H]  4.1   |  21[L]  |  3.43[H]    Ca    9.6      27 Oct 2024 06:38  Phos  4.2     10-27  Mg     2.2     10-27      Creatinine Trend: 3.43 <--, 3.65 <--, 3.55 <--, 3.79 <--                        9.3    5.14  )-----------( 298      ( 27 Oct 2024 06:38 )             28.5     Urine Studies:  Urinalysis Basic - ( 27 Oct 2024 06:38 )    Color:  / Appearance:  / SG:  / pH:   Gluc: 120 mg/dL / Ketone:   / Bili:  / Urobili:    Blood:  / Protein:  / Nitrite:    Leuk Esterase:  / RBC:  / WBC    Sq Epi:  / Non Sq Epi:  / Bacteria:         RADIOLOGY & ADDITIONAL STUDIES:    < from: CT Head No Cont (10.24.24 @ 16:21) >  Advanced periventricular deep and subcortical white matter   ischemia. 1.1 cm RIGHT anterior frontal parafalcine calcified angioma is   noted.    Marked central atrophy and moderate temporal lobe atrophy.    < end of copied text >    < from: TTE W or WO Ultrasound Enhancing Agent (10.25.24 @ 08:34) >  1. Left ventricular cavity is normal in size. Left ventricular wall thickness is normal. Left ventricular systolic function is normal with an ejection fraction visually estimated at 50 to 55 %. There are no regional wall motion abnormalities seen.   2. There is mild (grade 1) left ventricular diastolic dysfunction.   3. Normal right ventricular cavity size, with normal wall thickness, and reduced right ventricular systolic function. Tricuspid annular plane systolic excursion (TAPSE) is 1.6 cm (normal >=1.7 cm).   4. Normal left and right atrial size.   5. No significant valvular disease.   6. Basal septal hypertrophy (measures 1.6 cm) without evidence of left ventricular outflow tract obstruction.   7. Agitated saline injection was negative for intracardiac shunt.   8. Trace pericardial effusion.   9. No prior echocardiogram is available for comparison.    < end of copied text >

## 2024-10-28 VITALS — OXYGEN SATURATION: 100 % | SYSTOLIC BLOOD PRESSURE: 152 MMHG | DIASTOLIC BLOOD PRESSURE: 70 MMHG | HEART RATE: 88 BPM

## 2024-10-28 LAB
ANION GAP SERPL CALC-SCNC: 9 MMOL/L — SIGNIFICANT CHANGE UP (ref 5–17)
BUN SERPL-MCNC: 59 MG/DL — HIGH (ref 7–18)
CALCIUM SERPL-MCNC: 9.2 MG/DL — SIGNIFICANT CHANGE UP (ref 8.4–10.5)
CHLORIDE SERPL-SCNC: 109 MMOL/L — HIGH (ref 96–108)
CO2 SERPL-SCNC: 21 MMOL/L — LOW (ref 22–31)
CREAT SERPL-MCNC: 4.06 MG/DL — HIGH (ref 0.5–1.3)
EGFR: 11 ML/MIN/1.73M2 — LOW
GLUCOSE BLDC GLUCOMTR-MCNC: 104 MG/DL — HIGH (ref 70–99)
GLUCOSE BLDC GLUCOMTR-MCNC: 126 MG/DL — HIGH (ref 70–99)
GLUCOSE BLDC GLUCOMTR-MCNC: 228 MG/DL — HIGH (ref 70–99)
GLUCOSE SERPL-MCNC: 118 MG/DL — HIGH (ref 70–99)
HCT VFR BLD CALC: 26 % — LOW (ref 34.5–45)
HGB BLD-MCNC: 8.5 G/DL — LOW (ref 11.5–15.5)
MAGNESIUM SERPL-MCNC: 2.1 MG/DL — SIGNIFICANT CHANGE UP (ref 1.6–2.6)
MCHC RBC-ENTMCNC: 26.8 PG — LOW (ref 27–34)
MCHC RBC-ENTMCNC: 32.7 GM/DL — SIGNIFICANT CHANGE UP (ref 32–36)
MCV RBC AUTO: 82 FL — SIGNIFICANT CHANGE UP (ref 80–100)
NRBC # BLD: 0 /100 WBCS — SIGNIFICANT CHANGE UP (ref 0–0)
PHOSPHATE SERPL-MCNC: 3.9 MG/DL — SIGNIFICANT CHANGE UP (ref 2.5–4.5)
PLATELET # BLD AUTO: 297 K/UL — SIGNIFICANT CHANGE UP (ref 150–400)
POTASSIUM SERPL-MCNC: 4.1 MMOL/L — SIGNIFICANT CHANGE UP (ref 3.5–5.3)
POTASSIUM SERPL-SCNC: 4.1 MMOL/L — SIGNIFICANT CHANGE UP (ref 3.5–5.3)
RBC # BLD: 3.17 M/UL — LOW (ref 3.8–5.2)
RBC # FLD: 15.2 % — HIGH (ref 10.3–14.5)
SODIUM SERPL-SCNC: 139 MMOL/L — SIGNIFICANT CHANGE UP (ref 135–145)
WBC # BLD: 5.52 K/UL — SIGNIFICANT CHANGE UP (ref 3.8–10.5)
WBC # FLD AUTO: 5.52 K/UL — SIGNIFICANT CHANGE UP (ref 3.8–10.5)

## 2024-10-28 PROCEDURE — 76775 US EXAM ABDO BACK WALL LIM: CPT | Mod: 26

## 2024-10-28 PROCEDURE — 99239 HOSP IP/OBS DSCHRG MGMT >30: CPT

## 2024-10-28 RX ORDER — IRON SUCROSE 20 MG/ML
200 INJECTION, SOLUTION INTRAVENOUS ONCE
Refills: 0 | Status: COMPLETED | OUTPATIENT
Start: 2024-10-28 | End: 2024-10-28

## 2024-10-28 RX ORDER — FERROUS SULFATE 325(65) MG
1 TABLET ORAL
Qty: 90 | Refills: 0
Start: 2024-10-28

## 2024-10-28 RX ORDER — CALCITRIOL 0.25 UG/1
0.25 CAPSULE, LIQUID FILLED ORAL DAILY
Refills: 0 | Status: DISCONTINUED | OUTPATIENT
Start: 2024-10-28 | End: 2024-10-28

## 2024-10-28 RX ORDER — ASPIRIN/MAG CARB/ALUMINUM AMIN 325 MG
0 TABLET ORAL
Refills: 0 | DISCHARGE

## 2024-10-28 RX ORDER — LABETALOL HCL 200 MG
100 TABLET ORAL
Refills: 0 | DISCHARGE

## 2024-10-28 RX ORDER — LABETALOL HCL 200 MG
1 TABLET ORAL
Qty: 270 | Refills: 0
Start: 2024-10-28 | End: 2025-01-25

## 2024-10-28 RX ORDER — CALCITRIOL 0.25 UG/1
1 CAPSULE, LIQUID FILLED ORAL
Qty: 90 | Refills: 0
Start: 2024-10-28

## 2024-10-28 RX ORDER — ASPIRIN/MAG CARB/ALUMINUM AMIN 325 MG
1 TABLET ORAL
Qty: 90 | Refills: 0
Start: 2024-10-28 | End: 2025-01-25

## 2024-10-28 RX ADMIN — Medication 200 MILLIGRAM(S): at 05:26

## 2024-10-28 RX ADMIN — HEPARIN SODIUM 5000 UNIT(S): 10000 INJECTION INTRAVENOUS; SUBCUTANEOUS at 05:26

## 2024-10-28 RX ADMIN — HEPARIN SODIUM 5000 UNIT(S): 10000 INJECTION INTRAVENOUS; SUBCUTANEOUS at 13:23

## 2024-10-28 RX ADMIN — HYDRALAZINE HYDROCHLORIDE 25 MILLIGRAM(S): 50 TABLET, FILM COATED ORAL at 05:27

## 2024-10-28 RX ADMIN — Medication 81 MILLIGRAM(S): at 13:22

## 2024-10-28 RX ADMIN — Medication 2: at 12:14

## 2024-10-28 RX ADMIN — IRON SUCROSE 110 MILLIGRAM(S): 20 INJECTION, SOLUTION INTRAVENOUS at 13:50

## 2024-10-28 NOTE — PROGRESS NOTE ADULT - ASSESSMENT
79 year old female with PMHx of HTN, DM, HLD presented to hospital after a syncopal episode. The patient's lab work during the course of hospitalization is consistent with elevated serum creatinine, so the Nephrology service was consulted for further evaluation.    1. CKD stage V: The patient cannot remember what her serum creatinine was when she last saw a physician. She remember someone telling her that her kidney number "was 4". Creatinine upon admission at 3.79 mg/dL, had decreased to SCr 3.43 with elevation in SCr today; likley hemodynamically mediated with rapid correction of SCr.   Check UA, urine protein, urine creatinine.  Check Renal US.  will start calcitriol 0.25mcg PO daily. No urgent need for HD today but discussed with patient that she will need to f/u with me to arrange for outpt HD in the near future. Pt agreeable. Strict I/Os. Avoid nephrotoxins/ NSAIDs/ RCA. Monitor BMP.  Avoid additional nephrotoxic medications unless benefits outweigh risks. Dose medications for CrCl < 15. Need accurate measurements of urinary output.    2. Anemia: Tsat 24% ferritin 152. Will give Venofer 200mg IV x1 now; recc to start PO iron on d/c.  Will need cautions with ESAs, as patient has a history of a CVA. Consider blood transfusion if Hb is < 7 g/dL.    3. HTN w/ CKD: Patient had not been taking medications because her PCP retired a year ago. Will need to maintain BP < 140/90. Avoid rapid correction in BP.  c/w Labetalol 200 mg TID. Hold Hydralazine 25 mg TID. Defer on ACE-I/ARB due to poor renal function. Monitor BP    4. Hypokalemia: resolved. Monitor serum potassium.    Advised pt to f/u with me with in 1 week as pt will need HD in the near future    [facility] NEPHROLOGY  Moshe Oakley M.D.  Yony Infante D.O.  Hope Hidalgo M.D.  MD Caryn Frederick, MSN, ANP-C    Telephone: [phone]  Facsimile: [phone], [address]  [place] [address]

## 2024-10-28 NOTE — DISCHARGE NOTE NURSING/CASE MANAGEMENT/SOCIAL WORK - FINANCIAL ASSISTANCE
[facility] provides services at a reduced cost to those who are determined to be eligible through [facility]’s financial assistance program. Information regarding [facility]’s financial assistance program can be found by going to [email or web address] or by calling [phone].

## 2024-10-28 NOTE — PROGRESS NOTE ADULT - PROBLEM SELECTOR PLAN 4
Plateaued  - Trop: 123.5 (10/24) > 122.9 > 123.7 > 113.2 (10/25)  - EKG NSR Plateaued  Trop: 123.5 (10/24) > 122.9 > 123.7 > 113.2 (10/25)  EKG NSR

## 2024-10-28 NOTE — PROGRESS NOTE ADULT - PROVIDER SPECIALTY LIST ADULT
Internal Medicine
Internal Medicine
Nephrology
Internal Medicine

## 2024-10-28 NOTE — DISCHARGE NOTE NURSING/CASE MANAGEMENT/SOCIAL WORK - PATIENT PORTAL LINK FT
You can access the FollowMyHealth Patient Portal offered by [facility] by registering at the following website: [email or web address]. By joining [employer]’s FollowMyHealth portal, you will also be able to view your health information using other applications (apps) compatible with our system.

## 2024-10-28 NOTE — PROGRESS NOTE ADULT - PROBLEM SELECTOR PLAN 1
- c/w ASA 81mg PO daily   - c/w Atorvastatin 80 mg daily  - tele monitoring  orthpstatics positive  EKG NSR   TTE: LVEF 50-55%, no RWMA, trace pericardial effusion  CT showing chronic changes (confirmed with radiologist): Advanced periventricular deep and subcortical white matter ischemia   EKG showing NSR - c/w ASA 81mg PO daily   - c/w Atorvastatin 80 mg daily  - tele monitoring  orthpstatics positive  EKG NSR   TTE: LVEF 50-55%, no RWMA, trace pericardial effusion  CT: chronic changes (confirmed with radiologist): Advanced periventricular deep and subcortical white matter ischemia

## 2024-10-28 NOTE — PROGRESS NOTE ADULT - SUBJECTIVE AND OBJECTIVE BOX
[facility] NEPHROLOGY- PROGRESS NOTE    79 year old female with PMHx of HTN, DM, HLD presented to hospital after a syncopal episode. The patient's lab work during the course of hospitalization is consistent with elevated serum creatinine, so the Nephrology service was consulted for further evaluation.      Hospital Medications: Medications reviewed.  REVIEW OF SYSTEMS:  CONSTITUTIONAL: No fevers or chills  RESPIRATORY: No shortness of breath  CARDIOVASCULAR: No chest pain.  GASTROINTESTINAL: No nausea, vomiting, diarrhea or abdominal pain.   VASCULAR: No bilateral lower extremity edema.     VITALS:  T(F): 97.5 (10-28-24 @ 11:09), Max: 98.6 (10-28-24 @ 04:59)  HR: 66 (10-28-24 @ 11:09)  BP: 102/60 (10-28-24 @ 11:09)  RR: 18 (10-28-24 @ 11:09)  SpO2: 100% (10-28-24 @ 11:09)  Wt(kg): --  Height (cm): 157.5 (10-24 @ 13:37)  Weight (kg): 61.2 (10-24 @ 13:37)  BMI (kg/m2): 24.7 (10-24 @ 13:37)  BSA (m2): 1.62 (10-24 @ 13:37)    PHYSICAL EXAM:  Constitutional: NAD  Neurological: Awake and Alert  HEENT: anicteric sclera,   Respiratory: CTAB, no wheezes, rales or rhonchi  Cardiovascular: S1, S2, RRR  Gastrointestinal: BS+, soft, NT/ND  : No jacobs.  Extremities: No peripheral edema    LABS:  10-28    139  |  109[H]  |  59[H]  ----------------------------<  118[H]  4.1   |  21[L]  |  4.06[H]    Ca    9.2      28 Oct 2024 06:38  Phos  3.9     10-28  Mg     2.1     10-28      Creatinine Trend: 4.06 <--, 3.43 <--, 3.65 <--, 3.55 <--, 3.79 <--                        8.5    5.52  )-----------( 297      ( 28 Oct 2024 06:38 )             26.0     Urine Studies:  Urinalysis Basic - ( 28 Oct 2024 06:38 )    Color:  / Appearance:  / SG:  / pH:   Gluc: 118 mg/dL / Ketone:   / Bili:  / Urobili:    Blood:  / Protein:  / Nitrite:    Leuk Esterase:  / RBC:  / WBC    Sq Epi:  / Non Sq Epi:  / Bacteria:         RADIOLOGY & ADDITIONAL STUDIES:

## 2024-10-28 NOTE — PROGRESS NOTE ADULT - SUBJECTIVE AND OBJECTIVE BOX
PGY-1 Progress Note discussed with attending      PLEASE CONTACT ON CALL TEAM:  - On Call Team (Please refer to Callie) FROM 5:00 PM - 8:30PM  - Nightfloat Team FROM 8:30 -7:30 AM    INTERVAL HPI/OVERNIGHT EVENTS:     No acute [facility] events. [facility] has no new complaints.      REVIEW OF SYSTEMS:  CONSTITUTIONAL: No acute distress  RESPIRATORY: No shortness of breath, wheezing, or cough  CARDIOVASCULAR: No chest pain or palpitations  GASTROINTESTINAL: No abdominal pain, nausea, vomiting, diarrhea, or constipation  GENITOURINARY: No dysuria or hermaturia  NEUROLOGICAL: No numbness, tremors, or loss of strength  SKIN: No new lesions    MEDICATIONS  (STANDING):  aspirin enteric coated 81 milliGRAM(s) Oral daily  atorvastatin 80 milliGRAM(s) Oral at bedtime  dextrose 5%. 1000 milliLiter(s) (50 mL/Hr) IV Continuous <Continuous>  dextrose 50% Injectable 25 Gram(s) IV Push once  glucagon  Injectable 1 milliGRAM(s) IntraMuscular once  heparin   Injectable 5000 Unit(s) SubCutaneous every 8 hours  hydrALAZINE 25 milliGRAM(s) Oral every 8 hours  influenza  Vaccine (HIGH DOSE) 0.5 milliLiter(s) IntraMuscular once  insulin lispro (ADMELOG) corrective regimen sliding scale   SubCutaneous three times a day before meals  labetalol 200 milliGRAM(s) Oral every 8 hours    MEDICATIONS  (PRN):  acetaminophen     Tablet .. 650 milliGRAM(s) Oral every 6 hours PRN Temp greater or equal to 38C (100.4F), Mild Pain (1 - 3)  dextrose Oral Gel 15 Gram(s) Oral once PRN Blood Glucose LESS THAN 70 milliGRAM(s)/deciliter  melatonin 3 milliGRAM(s) Oral at bedtime PRN Insomnia  ondansetron Injectable 4 milliGRAM(s) IV Push every 8 hours PRN Nausea and/or Vomiting      Vital Signs Last 24 Hrs  T(C): 36.4 (28 Oct 2024 11:09), Max: 37 (28 Oct 2024 04:59)  T(F): 97.5 (28 Oct 2024 11:09), Max: 98.6 (28 Oct 2024 04:59)  HR: 66 (28 Oct 2024 11:09) (66 - 83)  BP: 102/60 (28 Oct 2024 11:09) (102/60 - 178/85)  BP(mean): 110 (27 Oct 2024 20:39) (106 - 110)  RR: 18 (28 Oct 2024 11:09) (18 - 18)  SpO2: 100% (28 Oct 2024 11:09) (98% - 100%)    Parameters below as of 28 Oct 2024 11:09  Patient On (Oxygen Delivery Method): room air        PHYSICAL EXAMINATION:  GENERAL: NAD  HEAD:  Atraumatic, Normocephalic  EYES:  conjunctiva and sclera clear  NECK: Supple  CHEST/LUNG: Clear to auscultation  HEART: Regular rate and rhythm; No murmurs, rubs, or gallops  ABDOMEN: Soft, Nontender, Bowel sounds present, no masses on palpation  NERVOUS SYSTEM:  Alert and oriented X4,   EXTREMITIES:  No BLE edema  SKIN: warm, dry                          8.5    5.52  )-----------( 297      ( 28 Oct 2024 06:38 )             26.0     10-28    139  |  109[H]  |  59[H]  ----------------------------<  118[H]  4.1   |  21[L]  |  4.06[H]    Ca    9.2      28 Oct 2024 06:38  Phos  3.9     10-28  Mg     2.1     10-28        Imaging:     CTH no cont:   Advanced periventricular deep and subcortical white matter ischemia. 1.1 cm RIGHT anterior frontal parafalcine calcified angioma is noted. Marked central atrophy and moderate temporal lobe atrophy.    TTE:  1. Left ventricular cavity is normal in size. Left ventricular wall thickness is normal. Left ventricular systolic function is normal with an ejection fraction visually estimated at 50 to 55 %. There are no regional wall motion abnormalities seen.   2. There is mild (grade 1) left ventricular diastolic dysfunction.   3. Normal right ventricular cavity size, with normal wall thickness, and reduced right ventricular systolic function. Tricuspid annular plane systolic excursion (TAPSE) is 1.6 cm (normal >=1.7 cm).   4. Normal left and right atrial size.   5. No significant valvular disease.   6. Basal septal hypertrophy (measures 1.6 cm) without evidence of left ventricular outflow tract obstruction.   7. Agitated saline injection was negative for intracardiac shunt.   8. Trace pericardial effusion.   9. No prior echocardiogram is available for comparison.     PGY-1 Progress Note discussed with attending      PLEASE CONTACT ON CALL TEAM:  - On Call Team (Please refer to Callie) FROM 5:00 PM - 8:30PM  - Nightfloat Team FROM 8:30 -7:30 AM    INTERVAL HPI/OVERNIGHT EVENTS:     No acute overnight events. [facility] Pt has no new complaints.      MEDICATIONS  (STANDING):  aspirin enteric coated 81 milliGRAM(s) Oral daily  atorvastatin 80 milliGRAM(s) Oral at bedtime  dextrose 5%. 1000 milliLiter(s) (50 mL/Hr) IV Continuous <Continuous>  dextrose 50% Injectable 25 Gram(s) IV Push once  glucagon  Injectable 1 milliGRAM(s) IntraMuscular once  heparin   Injectable 5000 Unit(s) SubCutaneous every 8 hours  hydrALAZINE 25 milliGRAM(s) Oral every 8 hours  influenza  Vaccine (HIGH DOSE) 0.5 milliLiter(s) IntraMuscular once  insulin lispro (ADMELOG) corrective regimen sliding scale   SubCutaneous three times a day before meals  labetalol 200 milliGRAM(s) Oral every 8 hours    MEDICATIONS  (PRN):  acetaminophen     Tablet .. 650 milliGRAM(s) Oral every 6 hours PRN Temp greater or equal to 38C (100.4F), Mild Pain (1 - 3)  dextrose Oral Gel 15 Gram(s) Oral once PRN Blood Glucose LESS THAN 70 milliGRAM(s)/deciliter  melatonin 3 milliGRAM(s) Oral at bedtime PRN Insomnia  ondansetron Injectable 4 milliGRAM(s) IV Push every 8 hours PRN Nausea and/or Vomiting      Vital Signs Last 24 Hrs  T(C): 36.4 (28 Oct 2024 11:09), Max: 37 (28 Oct 2024 04:59)  T(F): 97.5 (28 Oct 2024 11:09), Max: 98.6 (28 Oct 2024 04:59)  HR: 66 (28 Oct 2024 11:09) (66 - 83)  BP: 102/60 (28 Oct 2024 11:09) (102/60 - 178/85)  BP(mean): 110 (27 Oct 2024 20:39) (106 - 110)  RR: 18 (28 Oct 2024 11:09) (18 - 18)  SpO2: 100% (28 Oct 2024 11:09) (98% - 100%)    Parameters below as of 28 Oct 2024 11:09  Patient On (Oxygen Delivery Method): room air        PHYSICAL EXAMINATION:  GENERAL: NAD  HEAD:  Atraumatic, Normocephalic  EYES:  conjunctiva and sclera clear  NECK: Supple  CHEST/LUNG: Clear to auscultation  HEART: Regular rate and rhythm; No murmurs, rubs, or gallops  ABDOMEN: Soft, Nontender, Bowel sounds present, no masses on palpation  NERVOUS SYSTEM:  Alert, awake  EXTREMITIES:  No BLE edema  SKIN: warm, dry                          8.5    5.52  )-----------( 297      ( 28 Oct 2024 06:38 )             26.0     10-28    139  |  109[H]  |  59[H]  ----------------------------<  118[H]  4.1   |  21[L]  |  4.06[H]    Ca    9.2      28 Oct 2024 06:38  Phos  3.9     10-28  Mg     2.1     10-28        Imaging:     CTH no cont:   Advanced periventricular deep and subcortical white matter ischemia. 1.1 cm RIGHT anterior frontal parafalcine calcified angioma is noted. Marked central atrophy and moderate temporal lobe atrophy.    TTE:  1. Left ventricular cavity is normal in size. Left ventricular wall thickness is normal. Left ventricular systolic function is normal with an ejection fraction visually estimated at 50 to 55 %. There are no regional wall motion abnormalities seen.   2. There is mild (grade 1) left ventricular diastolic dysfunction.   3. Normal right ventricular cavity size, with normal wall thickness, and reduced right ventricular systolic function. Tricuspid annular plane systolic excursion (TAPSE) is 1.6 cm (normal >=1.7 cm).   4. Normal left and right atrial size.   5. No significant valvular disease.   6. Basal septal hypertrophy (measures 1.6 cm) without evidence of left ventricular outflow tract obstruction.   7. Agitated saline injection was negative for intracardiac shunt.   8. Trace pericardial effusion.   9. No prior echocardiogram is available for comparison.

## 2024-10-28 NOTE — PROGRESS NOTE ADULT - PROBLEM SELECTOR PLAN 3
- c/w labetalol 200mg q8hr, hydralazine 25mg q8hr  - Monitor BP: 216/88 (10/24) > 158/98 (10/25) - c/w labetalol 200mg q8hr, hydralazine 25mg q8hr  - Monitor BP: 216/88 (10/24) > 158/98 (10/25) > 102/60 (10/28) - c/w labetalol 200mg q8hr, HOLD hydralazine (per Nephro reccs), defer on ACE-I/ARB due to poor renal function.   - Monitor BP: 216/88 (10/24) > 158/98 (10/25) > 102/60 (10/28)  Need to maintain BP < 140/90. Avoid rapid correction in BP.      [facility] advise pt to f/u with them within 1 week as pt will need HD in the near future:    [facility] NEPHROLOGY  Moshe Oakley M.D.  Yony Infante D.O.  Hope Hidalgo M.D.  MD Caryn Frederick, MSN, ANP-C    Telephone: [phone]  Facsimile: [phone]    [address] [place] [address], [address]  [place] [address]

## 2024-10-28 NOTE — DISCHARGE NOTE NURSING/CASE MANAGEMENT/SOCIAL WORK - NSDCFUADDAPPT_GEN_ALL_CORE_FT
APPTS ARE READY TO BE MADE: [X] YES    Best Family or Patient Contact (if needed):    Additional Information about above appointments (if needed):  1: Nephrology - Dr. Hidalgo renal for AV Fistula planning   2: Primary care Dr. Huber Ang [phone]  3. Cardiology     Other comments or requests:

## 2024-10-28 NOTE — PROGRESS NOTE ADULT - PROBLEM SELECTOR PLAN 2
- Pt w/  BUN/Cr 50/ 3.79  - Baseline SCr - unknown   - Patient endorses Hx of kidney problem, did not follow with nephro as OP   - Follow BMP daily  - bladder scan normal - Pt w/  BUN/Cr 50/ 3.79 (10/24) > 50/3.43 (10/27) > 50/4.06 (10/28)  - Baseline SCr is unknown   - Check UA, urine protein, urine creatinine.  - Patient endorses Hx of kidney problem, did not follow with nephro as OP   - Follow BMP daily - Pt w/  BUN/Cr 50/ 3.79 (10/24) > 50/3.43 (10/27) > 50/4.06 (10/28)  - Baseline SCr is unknown   - Check UA, urine protein, urine creatinine.  - Check Renal US.   - , Vit D 13.7, will start calcitriol 0.25mcg PO daily.  - Strict I/Os. Avoid nephrotoxins/ NSAIDs/ RCA. Monitor BMP.

## 2024-10-28 NOTE — PROGRESS NOTE ADULT - PROBLEM/PLAN-8
DISPLAY PLAN FREE TEXT
normal (ped)...

## 2024-10-28 NOTE — PROGRESS NOTE ADULT - PROBLEM SELECTOR PLAN 5
- Hb 9.5 (10/24) > 9.9 (10/25)  - Maintain active T&S, 2 large bore peripheral IVs, transfuse for goal hgb >7 or if symptomatic  - trend CBC - Hb 9.5 (10/24) > 9.9 (10/25) > 8.5 (10/28)  - Maintain active T&S, 2 large bore peripheral IVs, transfuse for goal hgb >7 or if symptomatic  - trend CBC  - Tsat 24% ferritin 152. Will give Venofer 200mg IV x1

## 2024-10-29 NOTE — CHART NOTE - NSCHARTNOTEFT_GEN_A_CORE
Patient was outreached but did not answer nor could a voicemail be left. [phone].    Patient was outreached but did not answer. A voicemail was left for the patient to return our call. [phone].

## 2024-10-30 NOTE — CHART NOTE - NSCHARTNOTEFT_GEN_A_CORE
Patient was outreached on 10/29 and 10/30, but did not answer nor could a voicemail be left. [phone].    Patient was outreached on 10/29 and 10/30, but did not answer. A voicemail was left for the patient to return our call. [phone].

## 2024-10-31 RX ORDER — FERROUS SULFATE 325(65) MG
1 TABLET ORAL
Qty: 90 | Refills: 0
Start: 2024-10-31

## 2024-10-31 RX ORDER — FERROUS SULFATE 325(65) MG
1 TABLET ORAL
Qty: 15 | Refills: 0
Start: 2024-10-31 | End: 2024-11-29

## 2024-10-31 RX ORDER — CALCITRIOL 0.25 UG/1
1 CAPSULE, LIQUID FILLED ORAL
Qty: 90 | Refills: 0
Start: 2024-10-31

## 2024-10-31 RX ORDER — LABETALOL HCL 200 MG
1 TABLET ORAL
Qty: 270 | Refills: 0
Start: 2024-10-31 | End: 2025-01-28

## 2024-10-31 RX ORDER — CALCITRIOL 0.25 UG/1
1 CAPSULE, LIQUID FILLED ORAL
Qty: 30 | Refills: 0
Start: 2024-10-31 | End: 2024-11-29

## 2024-10-31 RX ORDER — LABETALOL HCL 200 MG
1 TABLET ORAL
Qty: 90 | Refills: 0
Start: 2024-10-31 | End: 2024-11-29

## 2024-10-31 RX ORDER — ASPIRIN/MAG CARB/ALUMINUM AMIN 325 MG
1 TABLET ORAL
Qty: 90 | Refills: 0
Start: 2024-10-31 | End: 2025-01-28

## 2024-10-31 RX ORDER — ASPIRIN/MAG CARB/ALUMINUM AMIN 325 MG
1 TABLET ORAL
Qty: 30 | Refills: 0
Start: 2024-10-31 | End: 2024-11-29

## 2024-11-26 PROCEDURE — 85027 COMPLETE CBC AUTOMATED: CPT

## 2024-11-26 PROCEDURE — 83735 ASSAY OF MAGNESIUM: CPT

## 2024-11-26 PROCEDURE — 84484 ASSAY OF TROPONIN QUANT: CPT

## 2024-11-26 PROCEDURE — 97116 GAIT TRAINING THERAPY: CPT

## 2024-11-26 PROCEDURE — 84100 ASSAY OF PHOSPHORUS: CPT

## 2024-11-26 PROCEDURE — 82728 ASSAY OF FERRITIN: CPT

## 2024-11-26 PROCEDURE — 83970 ASSAY OF PARATHORMONE: CPT

## 2024-11-26 PROCEDURE — 99285 EMERGENCY DEPT VISIT HI MDM: CPT

## 2024-11-26 PROCEDURE — 80048 BASIC METABOLIC PNL TOTAL CA: CPT

## 2024-11-26 PROCEDURE — 80061 LIPID PANEL: CPT

## 2024-11-26 PROCEDURE — 97161 PT EVAL LOW COMPLEX 20 MIN: CPT

## 2024-11-26 PROCEDURE — 85025 COMPLETE CBC W/AUTO DIFF WBC: CPT

## 2024-11-26 PROCEDURE — 93005 ELECTROCARDIOGRAM TRACING: CPT

## 2024-11-26 PROCEDURE — 76775 US EXAM ABDO BACK WALL LIM: CPT

## 2024-11-26 PROCEDURE — 82310 ASSAY OF CALCIUM: CPT

## 2024-11-26 PROCEDURE — 82962 GLUCOSE BLOOD TEST: CPT

## 2024-11-26 PROCEDURE — 83540 ASSAY OF IRON: CPT

## 2024-11-26 PROCEDURE — 83550 IRON BINDING TEST: CPT

## 2024-11-26 PROCEDURE — 70450 CT HEAD/BRAIN W/O DYE: CPT | Mod: MC

## 2024-11-26 PROCEDURE — 84443 ASSAY THYROID STIM HORMONE: CPT

## 2024-11-26 PROCEDURE — 82306 VITAMIN D 25 HYDROXY: CPT

## 2024-11-26 PROCEDURE — 36415 COLL VENOUS BLD VENIPUNCTURE: CPT

## 2024-11-26 PROCEDURE — 83036 HEMOGLOBIN GLYCOSYLATED A1C: CPT

## 2024-11-26 PROCEDURE — 97110 THERAPEUTIC EXERCISES: CPT

## 2024-11-26 PROCEDURE — 93306 TTE W/DOPPLER COMPLETE: CPT

## 2024-11-26 PROCEDURE — 80053 COMPREHEN METABOLIC PANEL: CPT

## 2024-12-15 ENCOUNTER — EMERGENCY (EMERGENCY)
Facility: HOSPITAL | Age: 79
LOS: 1 days | Discharge: HOME | End: 2024-12-15
Attending: EMERGENCY MEDICINE
Payer: MEDICARE

## 2024-12-15 VITALS
SYSTOLIC BLOOD PRESSURE: 187 MMHG | DIASTOLIC BLOOD PRESSURE: 91 MMHG | HEIGHT: 62 IN | TEMPERATURE: 98 F | HEART RATE: 69 BPM | OXYGEN SATURATION: 99 % | RESPIRATION RATE: 16 BRPM

## 2024-12-15 VITALS
RESPIRATION RATE: 18 BRPM | SYSTOLIC BLOOD PRESSURE: 188 MMHG | OXYGEN SATURATION: 99 % | TEMPERATURE: 98 F | HEART RATE: 77 BPM | DIASTOLIC BLOOD PRESSURE: 89 MMHG

## 2024-12-15 LAB
ALBUMIN SERPL ELPH-MCNC: 3.7 G/DL — SIGNIFICANT CHANGE UP (ref 3.5–5)
ALP SERPL-CCNC: 155 U/L — HIGH (ref 40–120)
ALT FLD-CCNC: 13 U/L DA — SIGNIFICANT CHANGE UP (ref 10–60)
ANION GAP SERPL CALC-SCNC: 6 MMOL/L — SIGNIFICANT CHANGE UP (ref 5–17)
AST SERPL-CCNC: 8 U/L — LOW (ref 10–40)
BASOPHILS # BLD AUTO: 0.03 K/UL — SIGNIFICANT CHANGE UP (ref 0–0.2)
BASOPHILS NFR BLD AUTO: 0.5 % — SIGNIFICANT CHANGE UP (ref 0–2)
BILIRUB SERPL-MCNC: 0.6 MG/DL — SIGNIFICANT CHANGE UP (ref 0.2–1.2)
BUN SERPL-MCNC: 54 MG/DL — HIGH (ref 7–18)
CALCIUM SERPL-MCNC: 10.2 MG/DL — SIGNIFICANT CHANGE UP (ref 8.4–10.5)
CHLORIDE SERPL-SCNC: 107 MMOL/L — SIGNIFICANT CHANGE UP (ref 96–108)
CO2 SERPL-SCNC: 27 MMOL/L — SIGNIFICANT CHANGE UP (ref 22–31)
CREAT SERPL-MCNC: 3.93 MG/DL — HIGH (ref 0.5–1.3)
EGFR: 11 ML/MIN/1.73M2 — LOW
EOSINOPHIL # BLD AUTO: 0.07 K/UL — SIGNIFICANT CHANGE UP (ref 0–0.5)
EOSINOPHIL NFR BLD AUTO: 1.1 % — SIGNIFICANT CHANGE UP (ref 0–6)
GLUCOSE SERPL-MCNC: 124 MG/DL — HIGH (ref 70–99)
HCT VFR BLD CALC: 30 % — LOW (ref 34.5–45)
HGB BLD-MCNC: 9.7 G/DL — LOW (ref 11.5–15.5)
IMM GRANULOCYTES NFR BLD AUTO: 0.2 % — SIGNIFICANT CHANGE UP (ref 0–0.9)
LIDOCAIN IGE QN: 62 U/L — SIGNIFICANT CHANGE UP (ref 13–75)
LYMPHOCYTES # BLD AUTO: 1.48 K/UL — SIGNIFICANT CHANGE UP (ref 1–3.3)
LYMPHOCYTES # BLD AUTO: 22.3 % — SIGNIFICANT CHANGE UP (ref 13–44)
MAGNESIUM SERPL-MCNC: 2.4 MG/DL — SIGNIFICANT CHANGE UP (ref 1.6–2.6)
MCHC RBC-ENTMCNC: 26.6 PG — LOW (ref 27–34)
MCHC RBC-ENTMCNC: 32.3 G/DL — SIGNIFICANT CHANGE UP (ref 32–36)
MCV RBC AUTO: 82.4 FL — SIGNIFICANT CHANGE UP (ref 80–100)
MONOCYTES # BLD AUTO: 0.35 K/UL — SIGNIFICANT CHANGE UP (ref 0–0.9)
MONOCYTES NFR BLD AUTO: 5.3 % — SIGNIFICANT CHANGE UP (ref 2–14)
NEUTROPHILS # BLD AUTO: 4.7 K/UL — SIGNIFICANT CHANGE UP (ref 1.8–7.4)
NEUTROPHILS NFR BLD AUTO: 70.6 % — SIGNIFICANT CHANGE UP (ref 43–77)
NRBC # BLD: 0 /100 WBCS — SIGNIFICANT CHANGE UP (ref 0–0)
NT-PROBNP SERPL-SCNC: 8589 PG/ML — HIGH (ref 0–450)
PHOSPHATE SERPL-MCNC: 3.3 MG/DL — SIGNIFICANT CHANGE UP (ref 2.5–4.5)
PLATELET # BLD AUTO: 323 K/UL — SIGNIFICANT CHANGE UP (ref 150–400)
POTASSIUM SERPL-MCNC: 3.4 MMOL/L — LOW (ref 3.5–5.3)
POTASSIUM SERPL-SCNC: 3.4 MMOL/L — LOW (ref 3.5–5.3)
PROT SERPL-MCNC: 8 G/DL — SIGNIFICANT CHANGE UP (ref 6–8.3)
RBC # BLD: 3.64 M/UL — LOW (ref 3.8–5.2)
RBC # FLD: 14 % — SIGNIFICANT CHANGE UP (ref 10.3–14.5)
SODIUM SERPL-SCNC: 140 MMOL/L — SIGNIFICANT CHANGE UP (ref 135–145)
TROPONIN I, HIGH SENSITIVITY RESULT: 25.3 NG/L — SIGNIFICANT CHANGE UP
WBC # BLD: 6.64 K/UL — SIGNIFICANT CHANGE UP (ref 3.8–10.5)
WBC # FLD AUTO: 6.64 K/UL — SIGNIFICANT CHANGE UP (ref 3.8–10.5)

## 2024-12-15 PROCEDURE — 93010 ELECTROCARDIOGRAM REPORT: CPT

## 2024-12-15 PROCEDURE — 99284 EMERGENCY DEPT VISIT MOD MDM: CPT | Mod: 25

## 2024-12-15 PROCEDURE — 70450 CT HEAD/BRAIN W/O DYE: CPT | Mod: 26,MC

## 2024-12-15 PROCEDURE — 71045 X-RAY EXAM CHEST 1 VIEW: CPT

## 2024-12-15 PROCEDURE — 99285 EMERGENCY DEPT VISIT HI MDM: CPT

## 2024-12-15 PROCEDURE — 83690 ASSAY OF LIPASE: CPT

## 2024-12-15 PROCEDURE — 70450 CT HEAD/BRAIN W/O DYE: CPT | Mod: MC

## 2024-12-15 PROCEDURE — 93005 ELECTROCARDIOGRAM TRACING: CPT

## 2024-12-15 PROCEDURE — 84100 ASSAY OF PHOSPHORUS: CPT

## 2024-12-15 PROCEDURE — 36415 COLL VENOUS BLD VENIPUNCTURE: CPT

## 2024-12-15 PROCEDURE — 83735 ASSAY OF MAGNESIUM: CPT

## 2024-12-15 PROCEDURE — 85025 COMPLETE CBC W/AUTO DIFF WBC: CPT

## 2024-12-15 PROCEDURE — 80053 COMPREHEN METABOLIC PANEL: CPT

## 2024-12-15 PROCEDURE — 84484 ASSAY OF TROPONIN QUANT: CPT

## 2024-12-15 PROCEDURE — 83880 ASSAY OF NATRIURETIC PEPTIDE: CPT

## 2024-12-15 PROCEDURE — 71045 X-RAY EXAM CHEST 1 VIEW: CPT | Mod: 26

## 2024-12-15 RX ORDER — SODIUM CHLORIDE 9 MG/ML
1000 INJECTION, SOLUTION INTRAMUSCULAR; INTRAVENOUS; SUBCUTANEOUS
Refills: 0 | Status: ACTIVE | OUTPATIENT
Start: 2024-12-15 | End: 2025-11-13

## 2024-12-15 RX ADMIN — SODIUM CHLORIDE 125 MILLILITER(S): 9 INJECTION, SOLUTION INTRAMUSCULAR; INTRAVENOUS; SUBCUTANEOUS at 16:54

## 2024-12-15 RX ADMIN — Medication 162 MILLIGRAM(S): at 16:53

## 2024-12-15 NOTE — ED PROVIDER NOTE - ACUTE OR EVOLVING MI?
Outpatient CD treatment resources. [facility]      [address] [facility]  [phone]  fax [phone]    [address] [facility], [facility]     *Does not have suboxone prescribing   [facility] (Dual Program)       physician, but will allow pt in program  [address]        w/suboxone, if they have a physician. [place], [place].   [phone]  fax [phone]    [address]   *Must be Dual  [address] [place], [address]        [phone]  [address] [facility]  5th Floor, [address]  [phone]  fax [phone]    [address] [facility] for [address] only  [facility], [address] [place] [address] Drive  [phone]  fax [phone]    [facility]     [place], [place]. [address] long as [address] is primary dx  [phone]      *walk-in intake assessment     fax [phone] no

## 2024-12-15 NOTE — ED PROVIDER NOTE - NSFOLLOWUPINSTRUCTIONS_ED_ALL_ED_FT
Dizziness    WHAT YOU NEED TO KNOW:    What is dizziness? Dizziness is a feeling of being off balance or unsteady. Common causes of dizziness are an inner ear fluid imbalance or a lack of oxygen in your blood. Dizziness may be acute (lasts 3 days or less) or chronic (lasts longer than 3 days). You may have dizzy spells that last from seconds to a few hours.    What increases my risk for dizziness? Dizziness may get worse during certain activities or when you move a certain way. The following may also increase your risk for dizziness:    Older age    An infection, ear surgery, or an inner ear condition, such as Ménière disease    Stroke, a brain tumor, or a recent head trauma    An injury that causes a large amount of blood loss    Heart or blood pressure problems    Exposure to chemicals, or long-term alcohol use    Medicines used to treat high blood pressure, seizure disorders, or anxiety and depression    A nerve disorder, such as multiple sclerosis  What signs and symptoms may happen with dizziness?    A feeling that your surroundings are moving even though you are standing still    Ringing in your ears or hearing loss    Feeling faint or lightheaded    Weakness or unsteadiness    Double vision or eye movements you cannot control    Nausea or vomiting    Confusion  How is the cause of dizziness diagnosed? Your healthcare provider may ask when the dizziness started. Tell the provider if you have dizzy spells, and how long they last. Tell the provider what happens before you become dizzy. The provider will ask if you have other health conditions and if you take any medicines. The provider will check your blood pressure and pulse to see if your dizziness may be related to your heart. Your balance, strength, reflexes, and the way you walk may also be checked. You may need any of the following tests to help find the cause of your dizziness:    An EKG records the electrical activity of your heart. An EKG can be used to check for an abnormal heart beat or heart damage.    Blood tests will check your blood sugar level, infection, and your blood cell levels.    CT or MRI pictures check for a stroke, head injury, or brain tumor. They also check for brain bleeding or swelling. You may be given contrast liquid to help your brain show up better in the pictures. Tell the healthcare provider if you have ever had an allergic reaction to contrast liquid. Do not enter the MRI room with anything metal. Metal can cause serious injury. Tell the healthcare provider if you have any metal in or on your body.  How is dizziness treated? Treatment will depend on the cause of your dizziness. Your healthcare provider may give you oxygen or medicines to decrease your dizziness and nausea. Your provider may also refer you to a specialist. You may need to be admitted to the hospital for treatment.    How can I manage my symptoms?    Do not drive or operate heavy machinery when you are dizzy.    Get up slowly from sitting or lying down.    Drink plenty of liquids. Liquids help prevent dehydration. Ask how much liquid to drink each day and which liquids are best for you.  When should I seek immediate care?    You have a headache and a stiff neck.    You have shaking chills and a fever.    You vomit over and over with no relief.    Your vomit or bowel movements are red or black.    You have pain in your chest, back, or abdomen.    You have numbness, especially in your face, arms, or legs.    You have trouble moving your arms or legs.    You are confused.  When should I contact my healthcare provider?    You have a fever.    Your symptoms do not get better with treatment.    You have questions or concerns about your condition or care.  CARE AGREEMENT:    You have the right to help plan your care. Learn about your health condition and how it may be treated. Discuss treatment options with your healthcare providers to decide what care you want to receive. You always have the right to refuse treatment.      Follow-up with your medical doctor, also kidney specialist   make sure you can eat and drink fluids before going out

## 2024-12-15 NOTE — ED PROVIDER NOTE - CLINICAL SUMMARY MEDICAL DECISION MAKING FREE TEXT BOX
79-year-old female with history of HTN, DM, HLD, was admitted 2 weeks ago with dizziness.  Patient now again complaining of feeling dizzy without any associated symptoms.  given history of DM, HLD, HTN, will get troponin, EKG, CT head and reassess

## 2024-12-15 NOTE — ED PROVIDER NOTE - OBJECTIVE STATEMENT
79-year-old female with history of HTN, DM, HLD.  She was brought in by ambulance, claims while she was in a supermarket, suddenly felt dizzy, she did not place herself on ground.  She denies vertigo, N/B, LOC, chest pain, SOB, headache, confusion, sick contact..  dizziness lasted about half hour. Patient states she only had a piece of toast before going out today.  Patient admits to having similar episode 2 weeks ago and was admitted with no abnormal findings.

## 2024-12-15 NOTE — ED PROVIDER NOTE - PATIENT PORTAL LINK FT
You can access the FollowMyHealth Patient Portal offered by [facility] by registering at the following website: [email or web address]. By joining [employer]’s FollowMyHealth portal, you will also be able to view your health information using other applications (apps) compatible with our system.

## 2024-12-15 NOTE — ED PROVIDER NOTE - PROGRESS NOTE DETAILS
Labs/CT result explained to patient   patient with history of CKD, anemia, troponins negative. patient denying further  dizziness except for that episode in the supermarket. patient is ambulating with no difficulties, will discharge home and advised to follow-up with PCP walked pt, O2 sat 97% rm air, will discharge home with daughter

## 2024-12-15 NOTE — ED ADULT NURSE NOTE - NSFALLRISKINTERV_ED_ALL_ED

## 2024-12-15 NOTE — ED ADULT NURSE NOTE - OBJECTIVE STATEMENT
Patient is BIBA, states "Got dizzy and slipped down". Patient denies falling and reports no LOC. Patient denies chest pain, no N/V/D, No SOB. Fall prevention protocol activated and patient education complete. Patient verbalized understanding.

## 2024-12-16 PROBLEM — E78.5 HYPERLIPIDEMIA, UNSPECIFIED: Chronic | Status: ACTIVE | Noted: 2024-10-24

## 2024-12-16 PROBLEM — I10 ESSENTIAL (PRIMARY) HYPERTENSION: Chronic | Status: ACTIVE | Noted: 2024-10-24

## 2025-06-04 NOTE — ED ADULT NURSE NOTE - SUICIDE SCREENING QUESTION 2
FAMILY HISTORY:  Family history of type 2 diabetes mellitus in mother    Father  Still living? Unknown  Family history of heart disease, Age at diagnosis: Age Unknown    Mother  Still living? Unknown  Family history of cancer in mother, Age at diagnosis: Age Unknown    
No